# Patient Record
Sex: FEMALE | Race: WHITE | Employment: OTHER | URBAN - METROPOLITAN AREA
[De-identification: names, ages, dates, MRNs, and addresses within clinical notes are randomized per-mention and may not be internally consistent; named-entity substitution may affect disease eponyms.]

---

## 2021-04-05 ENCOUNTER — TRANSCRIBE ORDER (OUTPATIENT)
Dept: SCHEDULING | Age: 76
End: 2021-04-05

## 2021-04-05 DIAGNOSIS — R22.32 LOCALIZED SWELLING, MASS AND LUMP, UPPER LIMB, LEFT: Primary | ICD-10-CM

## 2021-04-13 ENCOUNTER — HOSPITAL ENCOUNTER (OUTPATIENT)
Dept: MRI IMAGING | Age: 76
Discharge: HOME | End: 2021-04-13
Attending: ORTHOPAEDIC SURGERY

## 2021-04-13 VITALS — WEIGHT: 127 LBS

## 2021-04-13 DIAGNOSIS — R22.32 LOCALIZED SWELLING, MASS AND LUMP, UPPER LIMB, LEFT: ICD-10-CM

## 2021-04-13 RX ORDER — GADOTERATE MEGLUMINE 376.9 MG/ML
10 INJECTION INTRAVENOUS
Status: DISCONTINUED | OUTPATIENT
Start: 2021-04-13 | End: 2021-04-13

## 2021-05-08 ENCOUNTER — HOSPITAL ENCOUNTER (OUTPATIENT)
Dept: MRI IMAGING | Age: 76
Discharge: HOME | End: 2021-05-08
Attending: ORTHOPAEDIC SURGERY

## 2021-05-11 ENCOUNTER — APPOINTMENT (OUTPATIENT)
Dept: VASCULAR SURGERY | Age: 76
DRG: 829 | End: 2021-05-11
Attending: FAMILY MEDICINE
Payer: MEDICARE

## 2021-05-11 ENCOUNTER — APPOINTMENT (OUTPATIENT)
Dept: GENERAL RADIOLOGY | Age: 76
DRG: 829 | End: 2021-05-11
Attending: EMERGENCY MEDICINE
Payer: MEDICARE

## 2021-05-11 ENCOUNTER — APPOINTMENT (OUTPATIENT)
Dept: CT IMAGING | Age: 76
DRG: 829 | End: 2021-05-11
Attending: FAMILY MEDICINE
Payer: MEDICARE

## 2021-05-11 ENCOUNTER — HOSPITAL ENCOUNTER (INPATIENT)
Age: 76
LOS: 3 days | Discharge: HOME HEALTH | DRG: 829 | End: 2021-05-14
Attending: EMERGENCY MEDICINE | Admitting: FAMILY MEDICINE
Payer: MEDICARE

## 2021-05-11 ENCOUNTER — APPOINTMENT (OUTPATIENT)
Dept: CT IMAGING | Age: 76
DRG: 829 | End: 2021-05-11
Attending: EMERGENCY MEDICINE
Payer: MEDICARE

## 2021-05-11 DIAGNOSIS — R22.32 ARM MASS, LEFT: ICD-10-CM

## 2021-05-11 DIAGNOSIS — D49.89 CARDIAC TUMOR: ICD-10-CM

## 2021-05-11 DIAGNOSIS — C79.89 MALIGNANT NEOPLASM METASTATIC TO OTHER SITE (HCC): ICD-10-CM

## 2021-05-11 DIAGNOSIS — F41.9 ANXIETY: ICD-10-CM

## 2021-05-11 DIAGNOSIS — R91.8 MASS OF LUNG: ICD-10-CM

## 2021-05-11 DIAGNOSIS — R77.8 TROPONIN I ABOVE REFERENCE RANGE: ICD-10-CM

## 2021-05-11 DIAGNOSIS — E87.1 HYPONATREMIA: Primary | ICD-10-CM

## 2021-05-11 PROBLEM — R06.02 SOB (SHORTNESS OF BREATH): Status: ACTIVE | Noted: 2021-05-11

## 2021-05-11 LAB
ABO + RH BLD: NORMAL
ALBUMIN SERPL-MCNC: 2.6 G/DL (ref 3.5–5)
ALBUMIN/GLOB SERPL: 0.7 {RATIO} (ref 1.1–2.2)
ALP SERPL-CCNC: 134 U/L (ref 45–117)
ALT SERPL-CCNC: 23 U/L (ref 12–78)
ANION GAP SERPL CALC-SCNC: 6 MMOL/L (ref 5–15)
AST SERPL-CCNC: 19 U/L (ref 15–37)
ATRIAL RATE: 93 BPM
BASOPHILS # BLD: 0.1 K/UL (ref 0–0.1)
BASOPHILS NFR BLD: 0 % (ref 0–1)
BILIRUB SERPL-MCNC: 0.3 MG/DL (ref 0.2–1)
BLOOD GROUP ANTIBODIES SERPL: NORMAL
BNP SERPL-MCNC: 504 PG/ML
BUN SERPL-MCNC: 13 MG/DL (ref 6–20)
BUN/CREAT SERPL: 22 (ref 12–20)
CALCIUM SERPL-MCNC: 9.5 MG/DL (ref 8.5–10.1)
CALCULATED P AXIS, ECG09: 56 DEGREES
CALCULATED R AXIS, ECG10: -45 DEGREES
CALCULATED T AXIS, ECG11: 24 DEGREES
CHLORIDE SERPL-SCNC: 98 MMOL/L (ref 97–108)
CO2 SERPL-SCNC: 27 MMOL/L (ref 21–32)
COMMENT, HOLDF: NORMAL
COMMENT, HOLDF: NORMAL
COVID-19 RAPID TEST, COVR: NOT DETECTED
CREAT SERPL-MCNC: 0.6 MG/DL (ref 0.55–1.02)
DIAGNOSIS, 93000: NORMAL
DIFFERENTIAL METHOD BLD: ABNORMAL
EOSINOPHIL # BLD: 0.1 K/UL (ref 0–0.4)
EOSINOPHIL NFR BLD: 0 % (ref 0–7)
ERYTHROCYTE [DISTWIDTH] IN BLOOD BY AUTOMATED COUNT: 13.5 % (ref 11.5–14.5)
GLOBULIN SER CALC-MCNC: 3.8 G/DL (ref 2–4)
GLUCOSE SERPL-MCNC: 171 MG/DL (ref 65–100)
HCT VFR BLD AUTO: 34.6 % (ref 35–47)
HGB BLD-MCNC: 10.8 G/DL (ref 11.5–16)
IMM GRANULOCYTES # BLD AUTO: 0.1 K/UL (ref 0–0.04)
IMM GRANULOCYTES NFR BLD AUTO: 0 % (ref 0–0.5)
LACTATE SERPL-SCNC: 1.5 MMOL/L (ref 0.4–2)
LYMPHOCYTES # BLD: 1.2 K/UL (ref 0.8–3.5)
LYMPHOCYTES NFR BLD: 7 % (ref 12–49)
MCH RBC QN AUTO: 24.8 PG (ref 26–34)
MCHC RBC AUTO-ENTMCNC: 31.2 G/DL (ref 30–36.5)
MCV RBC AUTO: 79.4 FL (ref 80–99)
MONOCYTES # BLD: 0.9 K/UL (ref 0–1)
MONOCYTES NFR BLD: 5 % (ref 5–13)
NEUTS SEG # BLD: 13.7 K/UL (ref 1.8–8)
NEUTS SEG NFR BLD: 88 % (ref 32–75)
NRBC # BLD: 0 K/UL (ref 0–0.01)
NRBC BLD-RTO: 0 PER 100 WBC
P-R INTERVAL, ECG05: 128 MS
PLATELET # BLD AUTO: 393 K/UL (ref 150–400)
PMV BLD AUTO: 10.4 FL (ref 8.9–12.9)
POTASSIUM SERPL-SCNC: 3.4 MMOL/L (ref 3.5–5.1)
PROT SERPL-MCNC: 6.4 G/DL (ref 6.4–8.2)
Q-T INTERVAL, ECG07: 334 MS
QRS DURATION, ECG06: 76 MS
QTC CALCULATION (BEZET), ECG08: 415 MS
RBC # BLD AUTO: 4.36 M/UL (ref 3.8–5.2)
SAMPLES BEING HELD,HOLD: NORMAL
SAMPLES BEING HELD,HOLD: NORMAL
SODIUM SERPL-SCNC: 131 MMOL/L (ref 136–145)
SOURCE, COVRS: NORMAL
SPECIMEN EXP DATE BLD: NORMAL
TROPONIN I SERPL-MCNC: 0.15 NG/ML
VENTRICULAR RATE, ECG03: 93 BPM
WBC # BLD AUTO: 15.9 K/UL (ref 3.6–11)

## 2021-05-11 PROCEDURE — 86900 BLOOD TYPING SEROLOGIC ABO: CPT

## 2021-05-11 PROCEDURE — 70450 CT HEAD/BRAIN W/O DYE: CPT

## 2021-05-11 PROCEDURE — 74011000636 HC RX REV CODE- 636: Performed by: RADIOLOGY

## 2021-05-11 PROCEDURE — 83605 ASSAY OF LACTIC ACID: CPT

## 2021-05-11 PROCEDURE — 87635 SARS-COV-2 COVID-19 AMP PRB: CPT

## 2021-05-11 PROCEDURE — 71046 X-RAY EXAM CHEST 2 VIEWS: CPT

## 2021-05-11 PROCEDURE — 84484 ASSAY OF TROPONIN QUANT: CPT

## 2021-05-11 PROCEDURE — 73060 X-RAY EXAM OF HUMERUS: CPT

## 2021-05-11 PROCEDURE — 99285 EMERGENCY DEPT VISIT HI MDM: CPT

## 2021-05-11 PROCEDURE — 36415 COLL VENOUS BLD VENIPUNCTURE: CPT

## 2021-05-11 PROCEDURE — 74011250636 HC RX REV CODE- 250/636: Performed by: EMERGENCY MEDICINE

## 2021-05-11 PROCEDURE — 99223 1ST HOSP IP/OBS HIGH 75: CPT | Performed by: INTERNAL MEDICINE

## 2021-05-11 PROCEDURE — 85025 COMPLETE CBC W/AUTO DIFF WBC: CPT

## 2021-05-11 PROCEDURE — 83880 ASSAY OF NATRIURETIC PEPTIDE: CPT

## 2021-05-11 PROCEDURE — 93971 EXTREMITY STUDY: CPT

## 2021-05-11 PROCEDURE — 65660000000 HC RM CCU STEPDOWN

## 2021-05-11 PROCEDURE — 87040 BLOOD CULTURE FOR BACTERIA: CPT

## 2021-05-11 PROCEDURE — 93005 ELECTROCARDIOGRAM TRACING: CPT

## 2021-05-11 PROCEDURE — 80053 COMPREHEN METABOLIC PANEL: CPT

## 2021-05-11 PROCEDURE — 74011250636 HC RX REV CODE- 250/636: Performed by: FAMILY MEDICINE

## 2021-05-11 PROCEDURE — 71275 CT ANGIOGRAPHY CHEST: CPT

## 2021-05-11 RX ORDER — SODIUM CHLORIDE 9 MG/ML
75 INJECTION, SOLUTION INTRAVENOUS CONTINUOUS
Status: DISCONTINUED | OUTPATIENT
Start: 2021-05-11 | End: 2021-05-14 | Stop reason: HOSPADM

## 2021-05-11 RX ORDER — OMEPRAZOLE 10 MG/1
20 CAPSULE, DELAYED RELEASE ORAL 2 TIMES DAILY
COMMUNITY

## 2021-05-11 RX ORDER — POTASSIUM CHLORIDE 750 MG/1
10 TABLET, FILM COATED, EXTENDED RELEASE ORAL
Status: ACTIVE | OUTPATIENT
Start: 2021-05-11 | End: 2021-05-12

## 2021-05-11 RX ORDER — SODIUM CHLORIDE 0.9 % (FLUSH) 0.9 %
5-40 SYRINGE (ML) INJECTION EVERY 8 HOURS
Status: DISCONTINUED | OUTPATIENT
Start: 2021-05-11 | End: 2021-05-14 | Stop reason: HOSPADM

## 2021-05-11 RX ORDER — GUAIFENESIN 100 MG/5ML
81 LIQUID (ML) ORAL DAILY
Status: DISCONTINUED | OUTPATIENT
Start: 2021-05-11 | End: 2021-05-14 | Stop reason: HOSPADM

## 2021-05-11 RX ORDER — ALBUTEROL SULFATE 0.83 MG/ML
2.5 SOLUTION RESPIRATORY (INHALATION)
Status: DISCONTINUED | OUTPATIENT
Start: 2021-05-11 | End: 2021-05-14 | Stop reason: HOSPADM

## 2021-05-11 RX ORDER — ATORVASTATIN CALCIUM 20 MG/1
20 TABLET, FILM COATED ORAL
COMMUNITY

## 2021-05-11 RX ORDER — HEPARIN SODIUM 5000 [USP'U]/ML
5000 INJECTION, SOLUTION INTRAVENOUS; SUBCUTANEOUS EVERY 8 HOURS
Status: DISCONTINUED | OUTPATIENT
Start: 2021-05-11 | End: 2021-05-14 | Stop reason: HOSPADM

## 2021-05-11 RX ORDER — SODIUM CHLORIDE 0.9 % (FLUSH) 0.9 %
5-40 SYRINGE (ML) INJECTION AS NEEDED
Status: DISCONTINUED | OUTPATIENT
Start: 2021-05-11 | End: 2021-05-14 | Stop reason: HOSPADM

## 2021-05-11 RX ORDER — ACETAMINOPHEN 325 MG/1
650 TABLET ORAL
Status: DISCONTINUED | OUTPATIENT
Start: 2021-05-11 | End: 2021-05-14 | Stop reason: HOSPADM

## 2021-05-11 RX ORDER — SODIUM CHLORIDE 9 MG/ML
100 INJECTION, SOLUTION INTRAVENOUS CONTINUOUS
Status: DISCONTINUED | OUTPATIENT
Start: 2021-05-11 | End: 2021-05-12

## 2021-05-11 RX ORDER — ZOLPIDEM TARTRATE 10 MG/1
12.5 TABLET ORAL
COMMUNITY

## 2021-05-11 RX ADMIN — AZITHROMYCIN MONOHYDRATE 500 MG: 500 INJECTION, POWDER, LYOPHILIZED, FOR SOLUTION INTRAVENOUS at 18:44

## 2021-05-11 RX ADMIN — SODIUM CHLORIDE 1000 ML: 9 INJECTION, SOLUTION INTRAVENOUS at 12:27

## 2021-05-11 RX ADMIN — IOPAMIDOL 70 ML: 755 INJECTION, SOLUTION INTRAVENOUS at 13:14

## 2021-05-11 RX ADMIN — SODIUM CHLORIDE 75 ML/HR: 9 INJECTION, SOLUTION INTRAVENOUS at 18:43

## 2021-05-11 RX ADMIN — HEPARIN SODIUM 5000 UNITS: 5000 INJECTION INTRAVENOUS; SUBCUTANEOUS at 18:44

## 2021-05-11 NOTE — H&P
History & Physical    Primary Care Provider: Juany Farfan NP  Source of Information: Patient     History of Presenting Illness:   Antonio Xiao is a 70 y.o. female who presents with cough    History was primarily obtained from the patient    Patient reports that about a few months back she started noticing a big lump appearing on her left biceps. Patient not Evaluated by Primary Care Physician Who Ordered an MRI of the Lung but She Could Not have MRI done after multiple trials due to claustrophobia. Patient states that she was diagnosed with bronchitis recently, finished Z-Leonel approximately, patient reports that she has had some hemoptysis associated with her symptoms. Patient reports that she has had some fullness in her chest and feels like every time she swallows \"something is there\". Patient reports that she has had some choking sensation with swallowing liquids. Patient reports that she has seen Dr. Jeison Collins) for the mass but could not get an MRI done due to claustrophobia. Patient denies any other complaints or problems. The patient denies any Headache, blurry vision, sore throat, trouble swallowing, trouble with speech, chest pain, fever, chills, N/V/D, abd pain, urinary symptoms, constipation, recent travels, sick contacts, focal or generalized neurological symptoms,  falls, injuries, rashes, contact with COVID-19 diagnosed patients, hematemesis, melena, hematuria, rashes, denies starting any new medications and denies any other concerns or problems besides as mentioned above. Review of Systems:  A comprehensive review of systems was negative except for that written in the History of Present Illness. All other systems reviewed, pertinent positives and negatives noted in HPI    Past Medical History:   Diagnosis Date    Chronic obstructive pulmonary disease ([facility] [address]     Ill-defined condition     tumor in arm      History reviewed.  No pertinent surgical history. Prior to Admission medications    Not on File     No Known Allergies   History reviewed. No pertinent family history. Family history was discussed with the patient, all pertinent and relevant details are mentioned as above, no other pertinent and relevant family history was noted on my discussion with the patient. Patient specifically denies any history of Gaucher disease in the family  SOCIAL HISTORY:  Patient resides:  Independently x   Assisted Living    SNF    With family care       Smoking history:   None    Former x   Chronic      Alcohol history:   None    Social x   Chronic      Ambulates:   Independently x   w/cane    w/walker    w/wc    CODE STATUS:  DNR    Full x   Other      Objective:     Physical Exam:     Visit Vitals  /69   Pulse 84   Temp 97.2 °F (36.2 °C)   Resp 18   SpO2 98%      O2 Device: None (Room air)    General : alert x 3, awake, no acute distress, resting in bed, pleasant /female, appears to be stated age  [de-identified]: PEERL, EOMI, moist mucus membrane, TM clear  Neck: supple, no JVD, no meningeal signs  Chest: Scattered wheezes bilateral lung bases  CVS: S1 S2 heard, Capillary refill less than 2 seconds  Abd: soft/ Non tender, non distended, BS physiological,   Ext: no clubbing, no cyanosis, about 4-5 cm mass on the left upper extremity  Neuro/Psych: pleasant mood and affect, CN 2-12 grossly intact, sensory grossly within normal limit, Strength 5/5 in all extremities, DTR 1+ x 4  Skin: warm     EKG: Normal sinus rhythm with nonspecific ST changes      Data Review:     Recent Days:  Recent Labs     05/11/21  1138   WBC 15.9*   HGB 10.8*   HCT 34.6*        Recent Labs     05/11/21  1138   *   K 3.4*   CL 98   CO2 27   *   BUN 13   CREA 0.60   CA 9.5   ALB 2.6*   ALT 23     No results for input(s): PH, PCO2, PO2, HCO3, FIO2 in the last 72 hours.     24 Hour Results:  Recent Results (from the past 24 hour(s))   CBC WITH AUTOMATED DIFF    Collection Time: 05/11/21 11:38 AM   Result Value Ref Range    WBC 15.9 (H) 3.6 - 11.0 K/uL    RBC 4.36 3.80 - 5.20 M/uL    HGB 10.8 (L) 11.5 - 16.0 g/dL    HCT 34.6 (L) 35.0 - 47.0 %    MCV 79.4 (L) 80.0 - 99.0 FL    MCH 24.8 (L) 26.0 - 34.0 PG    MCHC 31.2 30.0 - 36.5 g/dL    RDW 13.5 11.5 - 14.5 %    PLATELET 457 777 - 244 K/uL    MPV 10.4 8.9 - 12.9 FL    NRBC 0.0 0  WBC    ABSOLUTE NRBC 0.00 0.00 - 0.01 K/uL    NEUTROPHILS 88 (H) 32 - 75 %    LYMPHOCYTES 7 (L) 12 - 49 %    MONOCYTES 5 5 - 13 %    EOSINOPHILS 0 0 - 7 %    BASOPHILS 0 0 - 1 %    IMMATURE GRANULOCYTES 0 0.0 - 0.5 %    ABS. NEUTROPHILS 13.7 (H) 1.8 - 8.0 K/UL    ABS. LYMPHOCYTES 1.2 0.8 - 3.5 K/UL    ABS. MONOCYTES 0.9 0.0 - 1.0 K/UL    ABS. EOSINOPHILS 0.1 0.0 - 0.4 K/UL    ABS. BASOPHILS 0.1 0.0 - 0.1 K/UL    ABS. IMM. GRANS. 0.1 (H) 0.00 - 0.04 K/UL    DF AUTOMATED     METABOLIC PANEL, COMPREHENSIVE    Collection Time: 05/11/21 11:38 AM   Result Value Ref Range    Sodium 131 (L) 136 - 145 mmol/L    Potassium 3.4 (L) 3.5 - 5.1 mmol/L    Chloride 98 97 - 108 mmol/L    CO2 27 21 - 32 mmol/L    Anion gap 6 5 - 15 mmol/L    Glucose 171 (H) 65 - 100 mg/dL    BUN 13 6 - 20 MG/DL    Creatinine 0.60 0.55 - 1.02 MG/DL    BUN/Creatinine ratio 22 (H) 12 - 20      GFR est AA >60 >60 ml/min/1.73m2    GFR est non-AA >60 >60 ml/min/1.73m2    Calcium 9.5 8.5 - 10.1 MG/DL    Bilirubin, total 0.3 0.2 - 1.0 MG/DL    ALT (SGPT) 23 12 - 78 U/L    AST (SGOT) 19 15 - 37 U/L    Alk. phosphatase 134 (H) 45 - 117 U/L    Protein, total 6.4 6.4 - 8.2 g/dL    Albumin 2.6 (L) 3.5 - 5.0 g/dL    Globulin 3.8 2.0 - 4.0 g/dL    A-G Ratio 0.7 (L) 1.1 - 2.2     SAMPLES BEING HELD    Collection Time: 05/11/21 11:38 AM   Result Value Ref Range    SAMPLES BEING HELD 1RED,1BLU     COMMENT        Add-on orders for these samples will be processed based on acceptable specimen integrity and analyte stability, which may vary by analyte.    TYPE & SCREEN    Collection Time: 05/11/21 11:38 AM Result Value Ref Range    Crossmatch Expiration 05/14/2021,2359     ABO/Rh(D) Femi Patellich POSITIVE     Antibody screen NEG    NT-PRO BNP    Collection Time: 05/11/21 11:38 AM   Result Value Ref Range    NT pro- (H) <125 PG/ML   TROPONIN I    Collection Time: 05/11/21 11:38 AM   Result Value Ref Range    Troponin-I, Qt. 0.15 (H) <0.05 ng/mL   EKG, 12 LEAD, INITIAL    Collection Time: 05/11/21 11:38 AM   Result Value Ref Range    Ventricular Rate 93 BPM    Atrial Rate 93 BPM    P-R Interval 128 ms    QRS Duration 76 ms    Q-T Interval 334 ms    QTC Calculation (Bezet) 415 ms    Calculated P Axis 56 degrees    Calculated R Axis -45 degrees    Calculated T Axis 24 degrees    Diagnosis       Normal sinus rhythm  Low voltage QRS  Left anterior fascicular block  No previous ECGs available           Imaging:   Xr Chest Pa Lat    Result Date: 5/11/2021  Round opacity in the right upper lobe and right hilar fullness are suspicious for a lung mass and lymphadenopathy. Xr Humerus Lt    Result Date: 5/11/2021  Nonspecific soft tissue mass in the midportion of the left upper arm. MRI without and with contrast could be used for further evaluation. 23X     Cta Chest W Or W Wo Cont    Result Date: 5/11/2021  1. Large right upper lobe mass extending to the right hilum. There is a lesion of the right hilum and right side of the mediastinum. There is a small area of local invasion in the anterolateral right second rib. Several metastatic lymph nodes are seen in the mediastinum. 2. Multiple right-sided pulmonary nodules likely representing metastases. 3. Small right pleural effusion. 4. Several nonspecific groundglass opacities in the left lung. 5. Destructive osseous metastases in the right lateral fifth rib, right T10 vertebral body, and left glenoid. 6. Incompletely seen mass in the left bicep muscle. 7. Enlarged lymph node in the left axilla.  8. 1.5 cm nodule in the inferior right breast. 9. Nodular thickening of the bilateral adrenal glands worrisome for metastases. 10. 4.7 x 3.9 cm mass in the left upper quadrant attached to the left colon. 11. 1.6 cm mass of the intra-atrial septum which may represent metastasis versus myxoma. Dr. Christina Key notified of malignancy at 2:35 PM on 5/11/2021. 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  Breast nodule  Metastasis to the adrenal glands  Colon mass  Intra-atrial septal cardiac mass  Hemoptysis  Hyponatremia  Hypokalemia  Elevated troponin  Left biceps mass  Leukocytosis  Anemia    patient will be admitted on a telemetry bed  Patient appears to have metastatic disease, unclear primary  IV hydration, oncology consult, pain control,  May consider palliative care consult, patient with colon mass, may consider GI consult  Elevated troponin likely secondary to intracardiac mass, patient denies any chest pain, continue aspirin, cardiac surgery consult requested, continue to monitor, obtain echocardiogram  Ortho consult for left upper arm mass  Hyponatremia likely secondary to hypovolemia, gentle IV hydration, repeat labs in the morning, continue to monitor  Replace potassium, check magnesium  Patient with leukocytosis, questionable pulmonary infection, broad-spectrum IV antibiotics empirically, blood cultures and continue to monitor  DVT prophylaxis: SCDs               Please note that this dictation was completed with VM[employer], the computer voice recognition software. Quite often unanticipated grammatical, syntax, homophones, and other interpretive errors are inadvertently transcribed by the computer software. Please disregard these errors. Please excuse any errors that have escaped final proofreading.          Signed By: Mariaelena Chapin MD     May 11, 2021 WDL

## 2021-05-11 NOTE — PROGRESS NOTES
Bedside shift change report given to Selvin RN (oncoming nurse) by Remy Conner RN (offgoing nurse). Report included the following information SBAR, ED Summary, Intake/Output, MAR, Med Rec Status and Cardiac Rhythm nsr.

## 2021-05-11 NOTE — ED TRIAGE NOTES
Pt sent here for bleeding in her lungs by pcp, pt stated she has coughed up about a tsp of bright red blood today, +sob, denies cp, denies fever or chills , +nausea

## 2021-05-11 NOTE — ED PROVIDER NOTES
Please note that this dictation was completed with [employer], the computer voice recognition software.  Quite often unanticipated grammatical, syntax, homophones, and other interpretive errors are inadvertently transcribed by the computer software.  Please disregard these errors.  Please excuse any errors that have escaped final proofreading. 30-year-old female past medical history markable for COPD, questionable tumor in her left arm per her PCP Dr. Mariama Hui presents ER complaining of \" increased shortness of breath x1 to 2 weeks. \"  Patient admits she is still smoking states she also gets short of breath when she smokes now. This is been a gradually worsening process patient approximate over the past 6 months. Patient states she was recently recently treated for bronchitis by her PCP finished a Z-Leonel approximately 2 days ago. Patient is noted that over the previous 2 or 3 days she is also coughed up blood on occasion when she coughs. She denies overt chest pain. Patient also relates that approximately past several months she has noticed an increased \"fullness\" she experiences in her chest when she swallows certain foods. Recently certain foods been \"getting caught in my chest when I swallow. \"  She has does drink water where they cause her to choke and she vomits them back up. \"  This also seems to be worsening. Patient denies overt fevers states she may have had some chills denies other current systemic complaints. I asked about the mass on her left arm she said that has also been there approximately 3 months, started out smaller and has grown to its current size. She is seen Dr. Mariama Hui (Ortho) who wanted to get an MRI to make sure it was not a sarcoma however patient was unable to tolerate the outpatient MRI was sent to the ER today for further evaluation of her left arm mass as well as the new onset hemoptysis.   Discussed checking lab work obtaining a chest CT imaging and doing the MRI inpatient the patient and her daughter agree with this plan. pt denies HA, vison changes, diff swallowing, CP, Abd pain, F/Ch, N/V, D/Cons or other current systemic complaints    Social/ PSH reviewed in EMR    EMR Chart Reviewed           Past Medical History:   Diagnosis Date    Chronic obstructive pulmonary disease ([facility] [address]     Ill-defined condition     tumor in arm       History reviewed. No pertinent surgical history. History reviewed. No pertinent family history. Social History     Socioeconomic History    Marital status:      Spouse name: Not on file    Number of children: Not on file    Years of education: Not on file    Highest education level: Not on file   Occupational History    Not on file   Social Needs    Financial resource strain: Not on file    Food insecurity     Worry: Not on file     Inability: Not on file    Transportation needs     Medical: Not on file     Non-medical: Not on file   Tobacco Use    Smoking status: Not on file   Substance and Sexual Activity    Alcohol use: Not on file    Drug use: Not on file    Sexual activity: Not on file   Lifestyle    Physical activity     Days per week: Not on file     Minutes per session: Not on file    Stress: Not on file   Relationships    Social connections     Talks on phone: Not on file     Gets together: Not on file     Attends Baptism service: Not on file     Active member of club or organization: Not on file     Attends meetings of clubs or organizations: Not on file     Relationship status: Not on file    Intimate partner violence     Fear of current or ex partner: Not on file     Emotionally abused: Not on file     Physically abused: Not on file     Forced sexual activity: Not on file   Other Topics Concern    Not on file   Social History Narrative    Not on file         ALLERGIES: Patient has no known allergies. Review of Systems   Constitutional: Positive for chills. Negative for appetite change and fever.    HENT: Positive for trouble swallowing. Negative for dental problem, drooling, rhinorrhea, sore throat and voice change. Eyes: Negative for photophobia and visual disturbance. Respiratory: Positive for cough and shortness of breath. Negative for chest tightness, wheezing and stridor. Cardiovascular: Negative for chest pain, palpitations and leg swelling. Gastrointestinal: Negative for abdominal pain, constipation, diarrhea, nausea and vomiting. Genitourinary: Negative for dysuria. Musculoskeletal: Positive for myalgias. Negative for back pain. Skin: Negative for rash. Neurological: Negative for facial asymmetry and speech difficulty. Psychiatric/Behavioral: Negative for confusion. All other systems reviewed and are negative. Vitals:    05/11/21 1123   BP: 102/61   Pulse: 90   Resp: 16   Temp: 97.2 °F (36.2 °C)   SpO2: 98%            Physical Exam  Vitals signs and nursing note reviewed. Constitutional:       General: She is not in acute distress. Appearance: Normal appearance. She is well-developed. She is not ill-appearing, toxic-appearing or diaphoretic. Comments: Uncomfortable appearing, AxOx4, speaking in complete sentences     HENT:      Head: Normocephalic and atraumatic. Right Ear: External ear normal.   Eyes:      General: No scleral icterus. Right eye: No discharge. Left eye: No discharge. Extraocular Movements: Extraocular movements intact. Conjunctiva/sclera: Conjunctivae normal.      Pupils: Pupils are equal, round, and reactive to light. Neck:      Musculoskeletal: Normal range of motion and neck supple. No neck rigidity or muscular tenderness. Vascular: No JVD. Trachea: No tracheal deviation. Cardiovascular:      Rate and Rhythm: Normal rate and regular rhythm. Pulses: Normal pulses. Heart sounds: Normal heart sounds. No murmur. No friction rub. No gallop.     Pulmonary:      Effort: Pulmonary effort is normal. No respiratory distress. Breath sounds: No stridor. Rales present. No wheezing or rhonchi. Comments: ?? RUL rales  Chest:      Chest wall: No tenderness. Abdominal:      General: Bowel sounds are normal.      Palpations: Abdomen is soft. Tenderness: There is no abdominal tenderness. There is no guarding or rebound. Hernia: No hernia is present. Genitourinary:     Vagina: No vaginal discharge. Musculoskeletal: Normal range of motion. General: Swelling, tenderness and deformity present. No signs of injury. Right lower leg: No edema. Left lower leg: No edema. Comments: LUE - noted tennis ball mass proximal LUE/ min redness/ feels 'hard' to palpation; pt has distal motor/ CV/ Sensation grossly intact    Skin:     General: Skin is warm and dry. Capillary Refill: Capillary refill takes less than 2 seconds. Coloration: Skin is not jaundiced or pale. Findings: No bruising, erythema, lesion or rash. Neurological:      General: No focal deficit present. Mental Status: She is alert and oriented to person, place, and time. Cranial Nerves: No cranial nerve deficit. Sensory: No sensory deficit. Motor: No weakness or abnormal muscle tone. Coordination: Coordination normal.      Gait: Gait normal.      Deep Tendon Reflexes: Reflexes normal.   Psychiatric:         Behavior: Behavior normal.         Thought Content: Thought content normal.          MDM  Number of Diagnoses or Management Options         Procedures    Chief Complaint   Patient presents with    Hemoptysis       12:07 PM  The patients presenting problems have been discussed, and they are in agreement with the care plan formulated and outlined with them. I have encouraged them to ask questions as they arise throughout their visit.     MEDICATIONS GIVEN:  Medications   sodium chloride 0.9 % bolus infusion 1,000 mL (has no administration in time range)       LABS REVIEWED:  Labs Reviewed   CBC WITH AUTOMATED DIFF - Abnormal; Notable for the following components:       Result Value    WBC 15.9 (*)     HGB 10.8 (*)     HCT 34.6 (*)     MCV 79.4 (*)     MCH 24.8 (*)     NEUTROPHILS 88 (*)     LYMPHOCYTES 7 (*)     ABS. NEUTROPHILS 13.7 (*)     ABS. IMM. GRANS. 0.1 (*)     All other components within normal limits   METABOLIC PANEL, COMPREHENSIVE   SAMPLES BEING HELD   TROPONIN I   NT-PRO BNP   TYPE & SCREEN       RADIOLOGY RESULTS:  The following have been ordered and reviewed:  _____________________________________________________________________  _____________________________________________________________________    EKG interpretation:   Rhythm: normal sinus rhythm; and regular . Rate (approx.): 92; Axis: normal; P wave: normal; QRS interval: normal ; ST/T wave: normal; Negative acute significant segmental elevations/ no old study    PROCEDURES:        CONSULTATIONS:       PROGRESS NOTES:      DIAGNOSIS:    1. Hyponatremia    2. Troponin I above reference range    3. Arm mass, left    4. Mass of lung    5. Malignant neoplasm metastatic to other site [facility])        PLAN:  1-      ED COURSE: The patients hospital course has been uncomplicated. 1:28 PM  Pt/ daughter told of results, agrees w/ Plans; Perfect Serve Consult for Admission  2:50 PM    ED Room Number: ER24/24  Patient Name and age:  Victor Manuel Orozco 70 y.o.  female  Working Diagnosis:   1. Hyponatremia    2. Troponin I above reference range    3. Arm mass, left    4. Mass of lung    5.  Malignant neoplasm metastatic to other site [facility])        COVID-19 Suspicion:  no  Sepsis present:  no  Reassessment needed: yes  Code Status:  Full Code  Readmission: no  Isolation Requirements:  no  Recommended Level of Care:  telemetry  Department:[facility] Adult ED - 21   Other:  Lung Mass/ multiple metastatic masses;

## 2021-05-11 NOTE — PROGRESS NOTES
[facility] [place] [address] [address], [address]  W: [phone]  F: [phone]    Reason for Visit:   Lei Benson is a 70 y.o. female who is seen in hospital/ ER at the request of Dr. Jonel Boogie for evaluation of abnormal CTs. Treatment History:   None from us yet    History of Present Illness:     Pt seen today in ER for abnormal CTs with likely metastatic cancer. No known cancer dx. No tissue dx of cancer. Pt being admitted for zhang. Pt is in bed getting ECHO at my visit. Daughter at bedside   Pt comfortable. C/o LUE pain. No fevers/ chills/ chest pain/ SOB/ nausea/ vomiting/diarrhea/    Past Medical History:   Diagnosis Date    Chronic obstructive pulmonary disease ([address]     Ill-defined condition     tumor in arm      History reviewed. No pertinent surgical history. Social History     Tobacco Use    Smoking status: Not on file   Substance Use Topics    Alcohol use: Not on file      History reviewed. No pertinent family history. Current Facility-Administered Medications   Medication Dose Route Frequency    0.9% sodium chloride infusion  75 mL/hr IntraVENous CONTINUOUS    aspirin chewable tablet 81 mg  81 mg Oral DAILY    potassium chloride SR (KLOR-CON 10) tablet 10 mEq  10 mEq Oral NOW    cefTRIAXone (ROCEPHIN) 1 g in 0.9% sodium chloride (MBP/ADV) 50 mL MBP  1 g IntraVENous Q24H    azithromycin (ZITHROMAX) 500 mg in 0.9% sodium chloride 250 mL (VIAL-MATE)  500 mg IntraVENous Q24H      No Known Allergies     Review of Systems: A complete review of systems was obtained, negative except as described above.     Physical Exam:     Visit Vitals  /73   Pulse 77   Temp 97.8 °F (36.6 °C)   Resp 20   SpO2 96%     ECOG PS: 1  General: No distress  Eyes: PERRLA, anicteric sclerae  HENT: Atraumatic  Neck: Supple  Respiratory: CTAB, normal respiratory effort  CV: Normal rate, regular rhythm  GI: Soft, nontender, nondistended  MS: in bed moving extremities  Skin: No rashes, ecchymoses, or petechiae. Normal temperature, turgor, and texture. Psych: Alert, oriented, appropriate affect, normal judgment/insight  Mass on LUE extremity    Results:     Lab Results   Component Value Date/Time    WBC 15.9 (H) 05/11/2021 11:38 AM    HGB 10.8 (L) 05/11/2021 11:38 AM    HCT 34.6 (L) 05/11/2021 11:38 AM    PLATELET 905 97/67/8641 11:38 AM    MCV 79.4 (L) 05/11/2021 11:38 AM    ABS. NEUTROPHILS 13.7 (H) 05/11/2021 11:38 AM     Lab Results   Component Value Date/Time    Sodium 131 (L) 05/11/2021 11:38 AM    Potassium 3.4 (L) 05/11/2021 11:38 AM    Chloride 98 05/11/2021 11:38 AM    CO2 27 05/11/2021 11:38 AM    Glucose 171 (H) 05/11/2021 11:38 AM    BUN 13 05/11/2021 11:38 AM    Creatinine 0.60 05/11/2021 11:38 AM    GFR est AA >60 05/11/2021 11:38 AM    GFR est non-AA >60 05/11/2021 11:38 AM    Calcium 9.5 05/11/2021 11:38 AM     Lab Results   Component Value Date/Time    Bilirubin, total 0.3 05/11/2021 11:38 AM    ALT (SGPT) 23 05/11/2021 11:38 AM    Alk. phosphatase 134 (H) 05/11/2021 11:38 AM    Protein, total 6.4 05/11/2021 11:38 AM    Albumin 2.6 (L) 05/11/2021 11:38 AM    Globulin 3.8 05/11/2021 11:38 AM       CT Results (most recent):  Results from Hospital Encounter encounter on 05/11/21   CTA CHEST W OR W WO CONT    Narrative EXAM: CTA CHEST W OR W WO CONT    INDICATION: hemoptosis/ ? sarcoma LUE    COMPARISON: Radiographs same day. CONTRAST: 70 mL of Isovue-370. TECHNIQUE:   Precontrast  images were obtained to localize the volume for acquisition. Multislice helical CT arteriography was performed from the diaphragm to the  thoracic inlet during uneventful rapid bolus intravenous contrast  administration. Lung and soft tissue windows were generated. Coronal and  sagittal images were generated and 3D post processing consisting of coronal  maximum intensity images was performed.   CT dose reduction was achieved through  use of a standardized protocol tailored for this examination and automatic  exposure control for dose modulation. FINDINGS:  THYROID: No nodule. MEDIASTINUM: There is a 1.1 cm anterior mediastinal lymph node. There is a 2.1  cm subcarinal lymph node. There is invasion of the right side of the  mediastinum. CHARLES: There is a large mass encompassing the right hilum. THORACIC AORTA: No dissection or aneurysm. PULMONARY ARTERIES: The pulmonary arteries are well enhanced. No evidence of  pulmonary embolus. There is significant narrowing of the right upper lobe  pulmonary arteries by the masses. TRACHEA/BRONCHI: There is moderate to severe narrowing of the right upper lobe  bronchus. There is mild narrowing of the right mainstem bronchus. There is mild  narrowing of the right middle and lower lobe bronchi. ESOPHAGUS: No wall thickening or dilatation. HEART: There is a mass involving the intra-atrial septa measuring 1.5 x 1.6 cm. This protrudes into the left atrium. PLEURA: There is a mild right pleural effusion. LUNGS: There is a large mass in the anterior right upper lobe measuring 6.9 x  3.4 cm. This extends to the superior right hilum. The mass continues in the  right hilum and envelops many of the structures of the right hilum measuring 7.2  x 5.7 cm. There is a 1.6 cm spiculated pulmonary nodule in the right apex on  image 99. There are several juxtapleural nodules in the right apex measuring up  to centimeters in size. There is a 9 mm nodule along the right oblique fissure  on image 84. There are adjacent 6 mm and 5 mm pulmonary nodule in the superior  segment right lower lobe on image 86. There is a 9 mm pulmonary nodule right  middle lobe on image 81. There is a 7 mm pulmonary nodule in the posterior right  lower lobe on image 76. Areas of atelectasis are seen in the most inferior  posterior right lower lobe. There is a 5 mm groundglass opacity in the lateral  left upper lobe on image 93.  There is an 8 mm ground glass nodule in the lateral  left upper lobe on image 80. There is a 6 mm ground glass opacity in the left  upper lobe on image 70. INCIDENTALLY IMAGED UPPER ABDOMEN: The patient is status post cholecystectomy. There is significant nodular thickening of both adrenal glands worrisome for  metastatic disease. There is a 4.7 x 3.9 cm mass in the left upper quadrant  attached to the left colon. BONES: There is a destructive osseous metastasis in the right side of T10  extending into the pedicle measuring 2.6 cm. There is a nonspecific 5 mm lytic  lesion in the right humeral head. There is a destructive lesion in the right  fifth rib with pathologic fracture. There is a small area of local invasion in  the anterolateral right second rib. There is a destructive lytic lesion in the  glenoid. The left upper extremity is incompletely imaged. There is a 3.7 cm mass  incompletely seen in the left biceps muscle. There is a 1.5 cm nodule in the inferior right breast on image 54. There is a 1.6 cm left axillary lymph node on image 92. Impression 1. Large right upper lobe mass extending to the right hilum. There is a lesion  of the right hilum and right side of the mediastinum. There is a small area of  local invasion in the anterolateral right second rib. Several metastatic lymph  nodes are seen in the mediastinum. 2. Multiple right-sided pulmonary nodules likely representing metastases. 3. Small right pleural effusion. 4. Several nonspecific groundglass opacities in the left lung. 5. Destructive osseous metastases in the right lateral fifth rib, right T10  vertebral body, and left glenoid. 6. Incompletely seen mass in the left bicep muscle. 7. Enlarged lymph node in the left axilla. 8. 1.5 cm nodule in the inferior right breast.  9. Nodular thickening of the bilateral adrenal glands worrisome for metastases. 10. 4.7 x 3.9 cm mass in the left upper quadrant attached to the left colon.   11. 1.6 cm mass of the intra-atrial septum which may represent metastasis versus  myxoma. Dr. Elma Samaniego notified of malignancy at 2:35 PM on 5/11/2021. Records reviewed and summarized above. Pathology report(s) reviewed above. Radiology report(s) reviewed above. Assessment/PLAN:     1)  Lung mass/ colon mass/bone mets/ breast mass/ large LUE mass  Found on CT today. Presumed metastatic cancer. Need tissue dx. Reviewed scans with pt and daughter at bedside in ER. D/w hospitalist and ER attending today. Easiest area to biopsy would be LUE mass. Can see if IR or ortho wants to biopsy. Would complete CT A/P  Consider GI eval for colon mass. Can do breast exam ( pt getting ECHO during my visit today) and mammo. Would do brain MRI. Biopsy may give us site of origin which will help direct therapy, along with molecular testing. Pt and daughter want to do biopsy and want to know dx. Pt is in bed and comfortable at my visit. Consider palliative care support. 2) LUE mass painful. Ortho/ IR for biopsy. Pain meds as needed. 3) hx of hemoptysis. Monitor. 4) psychosocial.  Mood good. Daughter at bedside and supportive. We will follow  Call if questions  D/w hospitalist and ER attending today    I appreciate the opportunity to participate in Ms. Gabino Toledo care.     Signed By: Trisha Davis DO

## 2021-05-11 NOTE — PROGRESS NOTES
TRANSFER - IN REPORT:    Verbal report received from germán RN(name) on Qian Ferrer  being received from ED (unit) for routine progression of care      Report consisted of patients Situation, Background, Assessment and   Recommendations(SBAR). Information from the following report(s) SBAR, ED Summary, Intake/Output, MAR, Med Rec Status and Cardiac Rhythm nsr was reviewed with the receiving nurse. Opportunity for questions and clarification was provided. Assessment completed upon patients arrival to unit and care assumed.

## 2021-05-12 ENCOUNTER — APPOINTMENT (OUTPATIENT)
Dept: NON INVASIVE DIAGNOSTICS | Age: 76
DRG: 829 | End: 2021-05-12
Attending: FAMILY MEDICINE
Payer: MEDICARE

## 2021-05-12 ENCOUNTER — APPOINTMENT (OUTPATIENT)
Dept: ULTRASOUND IMAGING | Age: 76
DRG: 829 | End: 2021-05-12
Attending: INTERNAL MEDICINE
Payer: MEDICARE

## 2021-05-12 LAB
ALBUMIN SERPL-MCNC: 2 G/DL (ref 3.5–5)
ALBUMIN/GLOB SERPL: 0.7 {RATIO} (ref 1.1–2.2)
ALP SERPL-CCNC: 107 U/L (ref 45–117)
ALT SERPL-CCNC: 19 U/L (ref 12–78)
ANION GAP SERPL CALC-SCNC: 8 MMOL/L (ref 5–15)
AST SERPL-CCNC: 21 U/L (ref 15–37)
BASOPHILS # BLD: 0.1 K/UL (ref 0–0.1)
BASOPHILS NFR BLD: 0 % (ref 0–1)
BILIRUB SERPL-MCNC: 0.2 MG/DL (ref 0.2–1)
BUN SERPL-MCNC: 9 MG/DL (ref 6–20)
BUN/CREAT SERPL: 21 (ref 12–20)
CALCIUM SERPL-MCNC: 8.2 MG/DL (ref 8.5–10.1)
CANCER AG125 SERPL-ACNC: 384 U/ML (ref 1.5–35)
CEA SERPL-MCNC: 89.2 NG/ML
CHLORIDE SERPL-SCNC: 102 MMOL/L (ref 97–108)
CO2 SERPL-SCNC: 24 MMOL/L (ref 21–32)
CREAT SERPL-MCNC: 0.43 MG/DL (ref 0.55–1.02)
DIFFERENTIAL METHOD BLD: ABNORMAL
ECHO AO ROOT DIAM: 3.27 CM
ECHO LV INTERNAL DIMENSION DIASTOLIC: 4.05 CM (ref 3.9–5.3)
ECHO LV INTERNAL DIMENSION SYSTOLIC: 2.45 CM
ECHO LV IVSD: 1.04 CM (ref 0.6–0.9)
ECHO LV MASS 2D: 131.4 G (ref 67–162)
ECHO LV MASS INDEX 2D: 86 G/M2 (ref 43–95)
ECHO LV POSTERIOR WALL DIASTOLIC: 0.98 CM (ref 0.6–0.9)
ECHO LVOT DIAM: 1.95 CM
ECHO PV PEAK INSTANTANEOUS GRADIENT SYSTOLIC: 2.08 MMHG
ECHO TV REGURGITANT MAX VELOCITY: 250.1 CM/S
ECHO TV REGURGITANT PEAK GRADIENT: 25.02 MMHG
EOSINOPHIL # BLD: 0.2 K/UL (ref 0–0.4)
EOSINOPHIL NFR BLD: 1 % (ref 0–7)
ERYTHROCYTE [DISTWIDTH] IN BLOOD BY AUTOMATED COUNT: 13.8 % (ref 11.5–14.5)
FERRITIN SERPL-MCNC: 232 NG/ML (ref 26–388)
FOLATE SERPL-MCNC: 5 NG/ML (ref 5–21)
GLOBULIN SER CALC-MCNC: 3 G/DL (ref 2–4)
GLUCOSE SERPL-MCNC: 105 MG/DL (ref 65–100)
HCT VFR BLD AUTO: 27.3 % (ref 35–47)
HGB BLD-MCNC: 8.4 G/DL (ref 11.5–16)
IMM GRANULOCYTES # BLD AUTO: 0.1 K/UL (ref 0–0.04)
IMM GRANULOCYTES NFR BLD AUTO: 1 % (ref 0–0.5)
IRON SATN MFR SERPL: 7 % (ref 20–50)
IRON SERPL-MCNC: 12 UG/DL (ref 35–150)
LYMPHOCYTES # BLD: 1.3 K/UL (ref 0.8–3.5)
LYMPHOCYTES NFR BLD: 10 % (ref 12–49)
MAGNESIUM SERPL-MCNC: 1.7 MG/DL (ref 1.6–2.4)
MCH RBC QN AUTO: 24.6 PG (ref 26–34)
MCHC RBC AUTO-ENTMCNC: 30.8 G/DL (ref 30–36.5)
MCV RBC AUTO: 80.1 FL (ref 80–99)
MONOCYTES # BLD: 1.1 K/UL (ref 0–1)
MONOCYTES NFR BLD: 8 % (ref 5–13)
NEUTS SEG # BLD: 10.3 K/UL (ref 1.8–8)
NEUTS SEG NFR BLD: 80 % (ref 32–75)
NRBC # BLD: 0 K/UL (ref 0–0.01)
NRBC BLD-RTO: 0 PER 100 WBC
PLATELET # BLD AUTO: 331 K/UL (ref 150–400)
PMV BLD AUTO: 10.7 FL (ref 8.9–12.9)
POTASSIUM SERPL-SCNC: 3.4 MMOL/L (ref 3.5–5.1)
PROT SERPL-MCNC: 5 G/DL (ref 6.4–8.2)
RBC # BLD AUTO: 3.41 M/UL (ref 3.8–5.2)
SODIUM SERPL-SCNC: 134 MMOL/L (ref 136–145)
TIBC SERPL-MCNC: 172 UG/DL (ref 250–450)
TROPONIN I SERPL-MCNC: 0.22 NG/ML
VIT B12 SERPL-MCNC: 1456 PG/ML (ref 193–986)
WBC # BLD AUTO: 13 K/UL (ref 3.6–11)

## 2021-05-12 PROCEDURE — 36415 COLL VENOUS BLD VENIPUNCTURE: CPT

## 2021-05-12 PROCEDURE — 74011250636 HC RX REV CODE- 250/636: Performed by: INTERNAL MEDICINE

## 2021-05-12 PROCEDURE — 77030040395 HC NDL BIOP COAX INTRO MRTM -B

## 2021-05-12 PROCEDURE — 82728 ASSAY OF FERRITIN: CPT

## 2021-05-12 PROCEDURE — 0KBF3ZX EXCISION OF RIGHT TRUNK MUSCLE, PERCUTANEOUS APPROACH, DIAGNOSTIC: ICD-10-PCS | Performed by: RADIOLOGY

## 2021-05-12 PROCEDURE — 88360 TUMOR IMMUNOHISTOCHEM/MANUAL: CPT

## 2021-05-12 PROCEDURE — 99222 1ST HOSP IP/OBS MODERATE 55: CPT | Performed by: THORACIC SURGERY (CARDIOTHORACIC VASCULAR SURGERY)

## 2021-05-12 PROCEDURE — 86301 IMMUNOASSAY TUMOR CA 19-9: CPT

## 2021-05-12 PROCEDURE — 82746 ASSAY OF FOLIC ACID SERUM: CPT

## 2021-05-12 PROCEDURE — 93306 TTE W/DOPPLER COMPLETE: CPT

## 2021-05-12 PROCEDURE — 83540 ASSAY OF IRON: CPT

## 2021-05-12 PROCEDURE — 65660000000 HC RM CCU STEPDOWN

## 2021-05-12 PROCEDURE — 84484 ASSAY OF TROPONIN QUANT: CPT

## 2021-05-12 PROCEDURE — 85025 COMPLETE CBC W/AUTO DIFF WBC: CPT

## 2021-05-12 PROCEDURE — 88305 TISSUE EXAM BY PATHOLOGIST: CPT

## 2021-05-12 PROCEDURE — 74011000258 HC RX REV CODE- 258: Performed by: INTERNAL MEDICINE

## 2021-05-12 PROCEDURE — 99233 SBSQ HOSP IP/OBS HIGH 50: CPT | Performed by: INTERNAL MEDICINE

## 2021-05-12 PROCEDURE — 77030014115

## 2021-05-12 PROCEDURE — 82378 CARCINOEMBRYONIC ANTIGEN: CPT

## 2021-05-12 PROCEDURE — 74011250636 HC RX REV CODE- 250/636: Performed by: FAMILY MEDICINE

## 2021-05-12 PROCEDURE — 82607 VITAMIN B-12: CPT

## 2021-05-12 PROCEDURE — 88374 M/PHMTRC ALYS ISHQUANT/SEMIQ: CPT

## 2021-05-12 PROCEDURE — 88342 IMHCHEM/IMCYTCHM 1ST ANTB: CPT

## 2021-05-12 PROCEDURE — 74011250637 HC RX REV CODE- 250/637: Performed by: FAMILY MEDICINE

## 2021-05-12 PROCEDURE — P9047 ALBUMIN (HUMAN), 25%, 50ML: HCPCS | Performed by: INTERNAL MEDICINE

## 2021-05-12 PROCEDURE — 76942 ECHO GUIDE FOR BIOPSY: CPT

## 2021-05-12 PROCEDURE — 83735 ASSAY OF MAGNESIUM: CPT

## 2021-05-12 PROCEDURE — 86304 IMMUNOASSAY TUMOR CA 125: CPT

## 2021-05-12 PROCEDURE — 88333 PATH CONSLTJ SURG CYTO XM 1: CPT

## 2021-05-12 PROCEDURE — 80053 COMPREHEN METABOLIC PANEL: CPT

## 2021-05-12 PROCEDURE — 88341 IMHCHEM/IMCYTCHM EA ADD ANTB: CPT

## 2021-05-12 PROCEDURE — 74011250637 HC RX REV CODE- 250/637: Performed by: INTERNAL MEDICINE

## 2021-05-12 RX ORDER — LEVETIRACETAM 500 MG/1
500 TABLET ORAL 2 TIMES DAILY
Status: DISCONTINUED | OUTPATIENT
Start: 2021-05-12 | End: 2021-05-14 | Stop reason: HOSPADM

## 2021-05-12 RX ORDER — MEGESTROL ACETATE 40 MG/1
40 TABLET ORAL DAILY
Status: DISCONTINUED | OUTPATIENT
Start: 2021-05-12 | End: 2021-05-14 | Stop reason: HOSPADM

## 2021-05-12 RX ORDER — ALBUMIN HUMAN 250 G/1000ML
25 SOLUTION INTRAVENOUS EVERY 6 HOURS
Status: DISCONTINUED | OUTPATIENT
Start: 2021-05-12 | End: 2021-05-14 | Stop reason: HOSPADM

## 2021-05-12 RX ORDER — DEXAMETHASONE SODIUM PHOSPHATE 4 MG/ML
4 INJECTION, SOLUTION INTRA-ARTICULAR; INTRALESIONAL; INTRAMUSCULAR; INTRAVENOUS; SOFT TISSUE EVERY 6 HOURS
Status: DISCONTINUED | OUTPATIENT
Start: 2021-05-12 | End: 2021-05-14

## 2021-05-12 RX ORDER — LIDOCAINE HYDROCHLORIDE 10 MG/ML
10 INJECTION, SOLUTION EPIDURAL; INFILTRATION; INTRACAUDAL; PERINEURAL
Status: COMPLETED | OUTPATIENT
Start: 2021-05-12 | End: 2021-05-12

## 2021-05-12 RX ADMIN — ALBUMIN (HUMAN) 25 G: 0.25 INJECTION, SOLUTION INTRAVENOUS at 08:47

## 2021-05-12 RX ADMIN — HEPARIN SODIUM 5000 UNITS: 5000 INJECTION INTRAVENOUS; SUBCUTANEOUS at 03:34

## 2021-05-12 RX ADMIN — SODIUM CHLORIDE 100 ML/HR: 9 INJECTION, SOLUTION INTRAVENOUS at 03:34

## 2021-05-12 RX ADMIN — LIDOCAINE HYDROCHLORIDE 10 ML: 10 INJECTION, SOLUTION EPIDURAL; INFILTRATION; INTRACAUDAL; PERINEURAL at 11:22

## 2021-05-12 RX ADMIN — Medication 10 ML: at 21:56

## 2021-05-12 RX ADMIN — MEGESTROL ACETATE 40 MG: 40 TABLET ORAL at 17:22

## 2021-05-12 RX ADMIN — ACETAMINOPHEN 650 MG: 325 TABLET ORAL at 12:05

## 2021-05-12 RX ADMIN — ASPIRIN 81 MG: 81 TABLET, CHEWABLE ORAL at 08:47

## 2021-05-12 RX ADMIN — HEPARIN SODIUM 5000 UNITS: 5000 INJECTION INTRAVENOUS; SUBCUTANEOUS at 11:43

## 2021-05-12 RX ADMIN — DEXAMETHASONE SODIUM PHOSPHATE 4 MG: 4 INJECTION, SOLUTION INTRAMUSCULAR; INTRAVENOUS at 14:48

## 2021-05-12 RX ADMIN — PIPERACILLIN AND TAZOBACTAM 3.38 G: 3; .375 INJECTION, POWDER, LYOPHILIZED, FOR SOLUTION INTRAVENOUS at 08:47

## 2021-05-12 RX ADMIN — DEXAMETHASONE SODIUM PHOSPHATE 4 MG: 4 INJECTION, SOLUTION INTRAMUSCULAR; INTRAVENOUS at 21:56

## 2021-05-12 RX ADMIN — ALBUMIN (HUMAN) 25 G: 0.25 INJECTION, SOLUTION INTRAVENOUS at 11:43

## 2021-05-12 RX ADMIN — HEPARIN SODIUM 5000 UNITS: 5000 INJECTION INTRAVENOUS; SUBCUTANEOUS at 19:28

## 2021-05-12 RX ADMIN — DEXAMETHASONE SODIUM PHOSPHATE 4 MG: 4 INJECTION, SOLUTION INTRAMUSCULAR; INTRAVENOUS at 08:47

## 2021-05-12 RX ADMIN — LEVETIRACETAM 500 MG: 500 TABLET ORAL at 17:22

## 2021-05-12 RX ADMIN — ALBUMIN (HUMAN) 25 G: 0.25 INJECTION, SOLUTION INTRAVENOUS at 17:22

## 2021-05-12 NOTE — PROGRESS NOTES
Hospitalist Progress Note      Hospital summary: Jhonny Bolden is a 70 y.o. female who presents with cough.    History was primarily obtained from the patient.    Patient reports that about a few months back she started noticing a big lump appearing on her left biceps. Patient not Evaluated by Primary Care Physician Who Ordered an MRI of the Lung but She Could Not have MRI done after multiple trials due to claustrophobia. Patient states that she was diagnosed with bronchitis recently, finished Z-Leonel approximately, patient reports that she has had some hemoptysis associated with her symptoms. Patient reports that she has had some fullness in her chest and feels like every time she swallows \"something is there\". Patient reports that she has had some choking sensation with swallowing liquids. Patient reports that she has seen Dr. Brie Varghese) for the mass but could not get an MRI done due to claustrophobia. Patient denies any other complaints or problems.   5/11/2021      Assessment/Plan:  Metastatic cancer - unknown primary. Appears aggressive   Right upper lobe lung mass / Colon mass/ Breast mass/ brain mass/ LUE mass  Pulmonary nodules/ Metastasis to the ribs/ Metastasis to the adrenal glands  -Intra-atrial septal cardiac mass - not shown on  Echo   - pt had hemoptysis. - CT chest: Large right upper lobe mass extending to the right hilum. There is a lesion of the right hilum and right side of the mediastinum. There is a small area of local invasion in the anterolateral right second rib. Several metastatic lymph nodes are seen in the mediastinum. Multiple right-sided pulmonary nodules likely representing metastases. Destructive osseous metastases in the right lateral fifth rib, right T10 vertebral body, and left glenoid  1.5 cm nodule in the inferior right breast  4.7 x 3.9 cm mass in the left upper quadrant attached to the left colon.   Nodular thickening of the bilateral adrenal glands worrisome for metastases  - CT head:  Left frontal mass with surrounding vasogenic edema resulting in mass effect on the left lateral ventricle and approximately 7 mm left to right subfalcine herniation  - started on IV decadron and keppra  - Oncology on board  - NSGY consulted  - appreciate CTS input \" Likely not surgical candidate\"  - US guided biopsy of right paraspinal lesion done on 5/12    Hyponatremia - improving  Hypokalemia - replaced  - will monitor     Leukocytosis - likely reactive - improving  - will hold off abx     Anemia  - iron profile and fobt ordered  - will monitor hb, transfuse <7    Blood pressure - soft  - on IVF and IV albumin  - will monitor    Nutrition  - poor appetite  - started on megace   - added nutritional supplements     Palliative care consulted     Code status: full  DVT prophylaxis: heparin   Disposition: TBD. Home when ready in 1-2 days   ----------------------------------------------    CC: LUE mass     S: patient is seen and examined at bedside. Daughter present. She just had bx done this AM. Denied any pain. Extensive discussion on CT chest/ abd/ head findings. Hopeful biopsy will guide us the primary cancer. But expressed that it appears very aggressive - only palliative treatment if available. They understood. Pt does not want any surgery. Discussed with nursing     Review of Systems:  A comprehensive review of systems was negative. O:  Visit Vitals  BP (!) 94/41 (BP 1 Location: Right arm, BP Patient Position: At rest)   Pulse 72   Temp 97.8 °F (36.6 °C)   Resp 18   Ht 5' 2\" (1.575 m)   Wt 53.5 kg (118 lb)   SpO2 98%   BMI 21.58 kg/m²       PHYSICAL EXAM:  Gen: NAD  HEENT: anicteric sclerae, normal conjunctiva, oropharynx clear, MM moist  Neck: supple, trachea midline, no adenopathy  Heart: RRR, no MRG, no JVD, no peripheral edema  Lungs: decreased breath sounds  Abd: soft, NT, ND, BS+, no organomegaly  Extr: warm.  LUE mass 7-8cm   Skin: dry, no rash  Neuro: CN II-XII grossly intact, normal speech, moves all extremities  Psych: normal mood, appropriate affect      Intake/Output Summary (Last 24 hours) at 5/12/2021 1615  Last data filed at 5/12/2021 0335  Gross per 24 hour   Intake 1687.08 ml   Output    Net 1687.08 ml        Recent labs & imaging reviewed:  Recent Results (from the past 24 hour(s))   CULTURE, BLOOD, PAIRED    Collection Time: 05/11/21  5:53 PM    Specimen: Blood   Result Value Ref Range    Special Requests: NO SPECIAL REQUESTS      Culture result: NO GROWTH AFTER 10 HOURS     LACTIC ACID    Collection Time: 05/11/21  5:53 PM   Result Value Ref Range    Lactic acid 1.5 0.4 - 2.0 MMOL/L   SAMPLES BEING HELD    Collection Time: 05/11/21  5:53 PM   Result Value Ref Range    SAMPLES BEING HELD 1PST     COMMENT        Add-on orders for these samples will be processed based on acceptable specimen integrity and analyte stability, which may vary by analyte. TROPONIN I    Collection Time: 05/12/21  3:50 AM   Result Value Ref Range    Troponin-I, Qt. 0.22 (H) <0.70 ng/mL   METABOLIC PANEL, COMPREHENSIVE    Collection Time: 05/12/21  3:50 AM   Result Value Ref Range    Sodium 134 (L) 136 - 145 mmol/L    Potassium 3.4 (L) 3.5 - 5.1 mmol/L    Chloride 102 97 - 108 mmol/L    CO2 24 21 - 32 mmol/L    Anion gap 8 5 - 15 mmol/L    Glucose 105 (H) 65 - 100 mg/dL    BUN 9 6 - 20 MG/DL    Creatinine 0.43 (L) 0.55 - 1.02 MG/DL    BUN/Creatinine ratio 21 (H) 12 - 20      GFR est AA >60 >60 ml/min/1.73m2    GFR est non-AA >60 >60 ml/min/1.73m2    Calcium 8.2 (L) 8.5 - 10.1 MG/DL    Bilirubin, total 0.2 0.2 - 1.0 MG/DL    ALT (SGPT) 19 12 - 78 U/L    AST (SGOT) 21 15 - 37 U/L    Alk.  phosphatase 107 45 - 117 U/L    Protein, total 5.0 (L) 6.4 - 8.2 g/dL    Albumin 2.0 (L) 3.5 - 5.0 g/dL    Globulin 3.0 2.0 - 4.0 g/dL    A-G Ratio 0.7 (L) 1.1 - 2.2     CBC WITH AUTOMATED DIFF    Collection Time: 05/12/21  3:50 AM   Result Value Ref Range    WBC 13.0 (H) 3.6 - 11.0 K/uL    RBC 3.41 (L) 3.80 - 5.20 M/uL    HGB 8.4 (L) 11.5 - 16.0 g/dL    HCT 27.3 (L) 35.0 - 47.0 %    MCV 80.1 80.0 - 99.0 FL    MCH 24.6 (L) 26.0 - 34.0 PG    MCHC 30.8 30.0 - 36.5 g/dL    RDW 13.8 11.5 - 14.5 %    PLATELET 012 136 - 179 K/uL    MPV 10.7 8.9 - 12.9 FL    NRBC 0.0 0  WBC    ABSOLUTE NRBC 0.00 0.00 - 0.01 K/uL    NEUTROPHILS 80 (H) 32 - 75 %    LYMPHOCYTES 10 (L) 12 - 49 %    MONOCYTES 8 5 - 13 %    EOSINOPHILS 1 0 - 7 %    BASOPHILS 0 0 - 1 %    IMMATURE GRANULOCYTES 1 (H) 0.0 - 0.5 %    ABS. NEUTROPHILS 10.3 (H) 1.8 - 8.0 K/UL    ABS. LYMPHOCYTES 1.3 0.8 - 3.5 K/UL    ABS. MONOCYTES 1.1 (H) 0.0 - 1.0 K/UL    ABS. EOSINOPHILS 0.2 0.0 - 0.4 K/UL    ABS. BASOPHILS 0.1 0.0 - 0.1 K/UL    ABS. IMM. GRANS. 0.1 (H) 0.00 - 0.04 K/UL    DF AUTOMATED     MAGNESIUM    Collection Time: 05/12/21  3:50 AM   Result Value Ref Range    Magnesium 1.7 1.6 - 2.4 mg/dL   ECHO ADULT COMPLETE    Collection Time: 05/12/21  2:28 PM   Result Value Ref Range    IVSd 1.04 (A) 0.60 - 0.90 cm    LVIDd 4.05 3.90 - 5.30 cm    LVIDs 2.45 cm    LVOT d 1.95 cm    LVPWd 0.98 (A) 0.60 - 0.90 cm    Pulmonic Valve Systolic Peak Instantaneous Gradient 2.08 mmHg    Triscuspid Valve Regurgitation Peak Gradient 25.02 mmHg    TR Max Velocity 250.10 cm/s    Ao Root D 3.27 cm    LV Mass .4 67.0 - 162.0 g    LV Mass AL Index 86.0 43.0 - 95.0 g/m2     Recent Labs     05/12/21  0350 05/11/21  1138   WBC 13.0* 15.9*   HGB 8.4* 10.8*   HCT 27.3* 34.6*    393     Recent Labs     05/12/21  0350 05/11/21  1138   * 131*   K 3.4* 3.4*    98   CO2 24 27   BUN 9 13   CREA 0.43* 0.60   * 171*   CA 8.2* 9.5   MG 1.7  --      Recent Labs     05/12/21 0350 05/11/21  1138   ALT 19 23    134*   TBILI 0.2 0.3   TP 5.0* 6.4   ALB 2.0* 2.6*   GLOB 3.0 3.8     No results for input(s): INR, PTP, APTT, INREXT in the last 72 hours. No results for input(s): FE, TIBC, PSAT, FERR in the last 72 hours.    No results found for: FOL, RBCF   No results for input(s): PH, PCO2, PO2 in the last 72 hours.   Recent Labs     05/12/21  0350 05/11/21  1138   TROIQ 0.22* 0.15*     No results found for: CHOL, CHOLX, CHLST, CHOLV, HDL, HDLP, LDL, LDLC, DLDLP, TGLX, TRIGL, TRIGP, CHHD, CHHDX  No results found for: GLUCPOC  No results found for: COLOR, APPRN, SPGRU, REFSG, JEAN PIERRE, PROTU, GLUCU, KETU, BILU, UROU, HEATHER, LEUKU, GLUKE, EPSU, BACTU, WBCU, RBCU, CASTS, UCRY    Med list reviewed  Current Facility-Administered Medications   Medication Dose Route Frequency    albumin human 25% (BUMINATE) solution 25 g  25 g IntraVENous Q6H    dexamethasone (DECADRON) 4 mg/mL injection 4 mg  4 mg IntraVENous Q6H    levETIRAcetam (KEPPRA) tablet 500 mg  500 mg Oral BID    megestroL (MEGACE) tablet 40 mg  40 mg Oral DAILY    0.9% sodium chloride infusion  75 mL/hr IntraVENous CONTINUOUS    sodium chloride (NS) flush 5-40 mL  5-40 mL IntraVENous Q8H    sodium chloride (NS) flush 5-40 mL  5-40 mL IntraVENous PRN    acetaminophen (TYLENOL) tablet 650 mg  650 mg Oral Q4H PRN    heparin (porcine) injection 5,000 Units  5,000 Units SubCUTAneous Q8H    albuterol (PROVENTIL VENTOLIN) nebulizer solution 2.5 mg  2.5 mg Nebulization Q4H PRN    aspirin chewable tablet 81 mg  81 mg Oral DAILY       Care Plan discussed with:  Patient/Family, Nurse and     Jarvis Lauren MD  Internal Medicine  Date of Service: 5/12/2021

## 2021-05-12 NOTE — PROGRESS NOTES
Spiritual Care Assessment/Progress Note  [facility]      NAME: Jackson Henderson      MRN: [id number]  AGE: 70 y.o. SEX: female  Jain Affiliation: Non Oriental orthodox   Language: English     5/12/2021           Spiritual Assessment begun in [address] [facility] through conversation with:         []Patient        [x] Family    [] Friend(s)        Reason for Consult: Palliative Care, Initial/Spiritual Assessment     Spiritual beliefs: (Please include comment if needed)     [x] Identifies with a cinthya tradition:         [] Supported by a cinthya community:            [] Claims no spiritual orientation:           [] Seeking spiritual identity:                [] Adheres to an individual form of spirituality:           [] Not able to assess:                           Identified resources for coping:      [x] Prayer                               [] Music                  [] Guided Imagery     [x] Family/friends                 [] Pet visits     [] Devotional reading                         [] Unknown     [] Other:                                               Interventions offered during this visit: (See comments for more details)    Patient Interventions: Initial/Spiritual assessment, patient floor     Family/Friend(s):  Affirmation of emotions/emotional suffering, Catharsis/review of pertinent events in supportive environment, Prayer (assurance of), Initial Assessment     Plan of Care:     [x] Support spiritual and/or cultural needs    [] Support AMD and/or advance care planning process      [] Support grieving process   [] Coordinate Rites and/or Rituals    [] Coordination with community clergy   [] No spiritual needs identified at this time   [] Detailed Plan of Care below (See Comments)  [] Make referral to Music Therapy  [] Make referral to Pet Therapy     [] Make referral to Addiction services  [] Make referral to [facility]  [] Make referral to Spiritual Care Partner  [] No future visits requested        [x] Follow up upon further referrals     Comments: Attempted to visit Ms Shawanda Sinha in room 444; patient was not in her room. Patient's daughter was there and shared that Ms Shawanda Sinha had just been taken for a test.    Provided pastoral presence and active listening as daughter shared that she thought Ms Shawanda Sinha was doing much better today than when initially admitted to [facility] [place]. She expressed no concerns at that time and accepted 's offer to keep them in prayer. Assured her of ongoing  availability for support. Daughter expressed great appreciation for visit. : Rev. Maggi Mims.  Adele Gutierres; [facility], to contact [phone] call: [phone]

## 2021-05-12 NOTE — CARDIO/PULMONARY
Cardiac Rehab: Per EMR, patient is a current smoker.  Smoking Cessation Program information placed on the AVS.   
Marcelle Elias RN

## 2021-05-12 NOTE — PROGRESS NOTES
Reason for Admission:  Right upper lobe lung mass- multiple masses identified- breast nodule, colon mass, etc.                      RUR Score:  11% risk of re-admission                    Plan for utilizing home health:    TBD- will monitor, patient was not receiving services prior to admission- would benefit from PT/OT evaluations       PCP: First and Last name:  Chhaya Espitia NP     Name of Practice: [address] [place] [address] Yazmin    Are you a current patient: Yes/No: Yes   Approximate date of last visit: Within the past year    Can you participate in a virtual visit with your PCP: N/A                    Current Advanced Directive/Advance Care Plan: Full Code      Healthcare Decision Maker:   Click here to complete [address] including selection of the Healthcare Decision Maker Relationship (ie \"Primary\") Patient does not have AMD, however, she verbally is deferring to spouse and daughter Catherine Macias present at bedside and supportive            Transition of Care Plan:   TBD- PT/OT evaluations would be beneficial, has numerous specialty consultations pending. The CM met with the patient and her daughter Jacy Jean-Baptiste at bedside in order to introduce the role of CM and assess for patient needs. The patient lives at home with her  in two-story home, however, has 1st floor living (basement). The patient's daughter Jacy Jean-Baptiste lives a less than a mile away. The patient and daughter verified insurance information. Prior to admission, the patient was independent with ADLs and mobility, no DME utilized in-the-home. The patient utilizes  for prescriptions, family to transport home. The CM will follow for transitions of care, consider therapy consultations. BRENT Michaud    Care Management Interventions  PCP Verified by CM: Yes(Patient verified PCP as Sandi Grullon NP)  Mode of Transport at Discharge:  Other (see comment)(Family to transport )  Transition of Care Consult (CM Consult): Discharge Planning  Physical Therapy Consult: No(Would benefit from PT consultation )  Occupational Therapy Consult: No(Would benefit from OT consutlation )  Current Support Network: Own Home, Lives with Spouse, Family Lives Nearby  Confirm Follow Up Transport: Family  Discharge Location  Discharge Placement: Home(Would benefit from PT/OT consultations, monitor for needs)

## 2021-05-12 NOTE — PROGRESS NOTES
Neurosurgery consult received   MRI with contrast would be test of choice next  If it can not be done then CT with contrast of the brain  Full note to follow after I have seen pt later today  Will also await biopsy results since it could direct care of metastatic disease as well

## 2021-05-12 NOTE — DISCHARGE INSTRUCTIONS
Smoking Cessation Program: This is a free, phone/text/email based, smoking cessation program. The program is individualized to meet each patient's needs. To enroll use the link - [facility]. [facility]/quit or text Bobby Estrada to [phone] from any smart phone.

## 2021-05-12 NOTE — CONSULTS
[address]    Name:  Yovana Han  MR#:  [id number]  :  1945  ACCOUNT #:  [de-identified]  DATE OF SERVICE:  2021    CHIEF COMPLAINT:  A 79-year-old woman with newly diagnosed left frontal metastatic brain tumor with a large lung mass. HISTORY OF PRESENT ILLNESS:  The patient has a new diagnosis of lung cancer, presents with a lesion in the left arm. She also has imaging studies that reveal widespread metastatic disease with a large right upper lobe lung mass. PAST MEDICAL HISTORY:  COPD. MEDICATIONS:  Include,  1. Prilosec. 2.  Ambien. 3.  Lipitor. ALLERGIES:  NO KNOWN DRUG ALLERGIES. REVIEW OF SYSTEMS:  No other GI, , respiratory, cardiac, dermatologic, neurologic, psychiatric, endocrinologic, or hematologic complaints. PHYSICAL EXAMINATION:  VITAL SIGNS:  On admission, blood pressure was 111/58, pulse rate 77, temperature 99.6. NEUROLOGIC:  She is awake, alert, cooperative. Moves all four extremities. Pupils equal and reactive. Extraocular muscles intact. Face is symmetric. Palate rises equally. Tongue protrudes midline. Gait and station are deferred. Cerebellar coordination is deferred. Imaging studies include a CT scan without contrast that shows a low-density lesion in the left frontal area consistent with a frontal mass. The test of choice would be an MRI with contrast, if that is not possible because of the patient's history of claustrophobia then a CT with contrast would be the next best thing. However, the patient has widespread metastatic disease and I am not sure at this point if surgical intervention is going to change the course of the disease process. I agree that she needs a biopsy. It appears that the arm lesion is probably the most easily accessible and that will help drive the further care based on the findings of the biopsy.   Therefore, I will defer care to Hematology/Oncology and the hospitalist, and if needed surgical resection of the brain tumor could be accomplished. Thank you for asking me to see the patient.       Darwin Odell MD      [id number]  D:  05/12/2021 16:34  T:  05/12/2021 17:32  JOB #:  [id number]  CC:  Fani Sanon MD

## 2021-05-12 NOTE — PROGRESS NOTES
[facility] [place] [address] [address], [address]  W: [phone]  F: [phone]    Reason for Visit:   Ileana Perea is a 70 y.o. female who is seen in hospital/ ER at the request of Dr. Wendy Chapin for evaluation of abnormal CTs. Treatment History:   None from us yet    History of Present Illness:     Pt seen today in ER for abnormal CTs with likely metastatic cancer. No known cancer dx. No tissue dx of cancer. Pt being admitted for zhang. Pt is in bed getting ECHO at my visit. Daughter at bedside   Pt comfortable. C/o LUE pain. No fevers/ chills/ chest pain/ SOB/ nausea/ vomiting/diarrhea/    Past Medical History:   Diagnosis Date    Chronic obstructive pulmonary disease ([address]     Ill-defined condition     tumor in arm      History reviewed. No pertinent surgical history. Social History     Tobacco Use    Smoking status: Not on file   Substance Use Topics    Alcohol use: Not on file      History reviewed. No pertinent family history.   Current Facility-Administered Medications   Medication Dose Route Frequency    albumin human 25% (BUMINATE) solution 25 g  25 g IntraVENous Q6H    dexamethasone (DECADRON) 4 mg/mL injection 4 mg  4 mg IntraVENous Q6H    levETIRAcetam (KEPPRA) tablet 500 mg  500 mg Oral BID    megestroL (MEGACE) tablet 40 mg  40 mg Oral DAILY    0.9% sodium chloride infusion  75 mL/hr IntraVENous CONTINUOUS    sodium chloride (NS) flush 5-40 mL  5-40 mL IntraVENous Q8H    sodium chloride (NS) flush 5-40 mL  5-40 mL IntraVENous PRN    acetaminophen (TYLENOL) tablet 650 mg  650 mg Oral Q4H PRN    heparin (porcine) injection 5,000 Units  5,000 Units SubCUTAneous Q8H    albuterol (PROVENTIL VENTOLIN) nebulizer solution 2.5 mg  2.5 mg Nebulization Q4H PRN    aspirin chewable tablet 81 mg  81 mg Oral DAILY      No Known Allergies     Review of Systems: A complete review of systems was obtained, negative except as described above.    Physical Exam:     Visit Vitals  BP (!) 94/41 (BP 1 Location: Right arm, BP Patient Position: At rest)   Pulse 72   Temp 97.8 °F (36.6 °C)   Resp 18   Ht 5' 2\" (1.575 m)   Wt 118 lb (53.5 kg)   SpO2 98%   BMI 21.58 kg/m²     ECOG PS: 1  General: No distress  Eyes: PERRLA, anicteric sclerae  HENT: Atraumatic  Neck: Supple  Respiratory: CTAB, normal respiratory effort  CV: Normal rate, regular rhythm  GI: Soft, nontender, nondistended  MS: in bed moving extremities  Skin: No rashes, ecchymoses, or petechiae. Normal temperature, turgor, and texture. Psych: Alert, oriented, appropriate affect, normal judgment/insight  Mass on LUE extremity  Breast no masses on LEFT, right mass outer mid quadrant, mobile about 1.5 cm    Results:     Lab Results   Component Value Date/Time    WBC 13.0 (H) 05/12/2021 03:50 AM    HGB 8.4 (L) 05/12/2021 03:50 AM    HCT 27.3 (L) 05/12/2021 03:50 AM    PLATELET 516 98/68/3511 03:50 AM    MCV 80.1 05/12/2021 03:50 AM    ABS. NEUTROPHILS 10.3 (H) 05/12/2021 03:50 AM     Lab Results   Component Value Date/Time    Sodium 134 (L) 05/12/2021 03:50 AM    Potassium 3.4 (L) 05/12/2021 03:50 AM    Chloride 102 05/12/2021 03:50 AM    CO2 24 05/12/2021 03:50 AM    Glucose 105 (H) 05/12/2021 03:50 AM    BUN 9 05/12/2021 03:50 AM    Creatinine 0.43 (L) 05/12/2021 03:50 AM    GFR est AA >60 05/12/2021 03:50 AM    GFR est non-AA >60 05/12/2021 03:50 AM    Calcium 8.2 (L) 05/12/2021 03:50 AM     Lab Results   Component Value Date/Time    Bilirubin, total 0.2 05/12/2021 03:50 AM    ALT (SGPT) 19 05/12/2021 03:50 AM    Alk. phosphatase 107 05/12/2021 03:50 AM    Protein, total 5.0 (L) 05/12/2021 03:50 AM    Albumin 2.0 (L) 05/12/2021 03:50 AM    Globulin 3.0 05/12/2021 03:50 AM       CT Results (most recent):  Results from Hospital Encounter encounter on 05/11/21   CT HEAD WO CONT    Narrative EXAMINATION:  CT HEAD WO CONT    CLINICAL INFORMATION:  metastatic disease  COMPARISON:  None.    TECHNIQUE: Routine axial head CT was performed. IV contrast was not  administered. Sagittal and coronal reconstructions were generated. CT dose reduction was achieved through use of a standardized protocol tailored  for this examination and automatic exposure control for dose modulation. FINDINGS:    VENTRICULAR SYSTEM:  Normal for age. BASAL CISTERNS:  Patent. BRAIN PARENCHYMA:  Left frontal vasogenic edema associated with an anterior  frontal mass which is approximately 2.2 x 1.9 x 2.2 cm in size possible adjacent  left frontal mass, not clearly delineated. No other discrete mass is seen on  noncontrast imaging although right occipital and right frontal areas of mild  vasogenic edema may be due to adjacent metastatic disease. No intracranial  hemorrhage or acute infarct. MIDLINE SHIFT:  7 mm left-to-right subfalcine herniation. CALVARIUM/ SKULL BASE: No destructive lesion. Probable venous lake in the right  frontoparietal calvarium. PARANASAL SINUSES AND MASTOID AIR CELLS: Clear. VISUALIZED ORBITS: No significant abnormalities. SELLA: No enlargement. Impression Left frontal mass with surrounding vasogenic edema resulting in mass effect on  the left lateral ventricle and approximately 7 mm left to right subfalcine  herniation. No other discrete mass is demonstrated although foci of vasogenic  edema suggest other areas of intracranial metastatic disease. MRI is the optimal examination for detection of intracranial metastatic disease. Records reviewed and summarized above. Pathology report(s) reviewed above. Radiology report(s) reviewed above. Assessment/PLAN:     1)  Lung mass/ colon mass/bone mets/ breast mass/ large LUE mass  Found on CT. Presumed metastatic cancer. CT head shows mets. Needs brain MRI. Had biopsy today of unclear site. Await path. Biopsy may give us site of origin which will help direct therapy, along with molecular testing.    Pt does have a RIGHT breast mass on exam and can do mammo/ ultrasound/ possible biopsy if needed. Pt clinically better today. Reviewed all with pt and daughter at bedside. Discussed possible cancer that has spread. Pt is interested in treatment options. 2) brain mets on CT. Needs brain MRI. On decadron/ keppra. 3) breast mass on exam. Can do mammo/ ultrasound biopsy if needed for tissue. 4) anemia. Drop from 10 to 8. Monitor. Check iron levels since has colon mass. 5) colon mass. Can do colo if path needed. 6) psychosocial.  Mood good. Daughter at bedside and supportive. Reviewed all with her today. We will follow  Call if questions    I appreciate the opportunity to participate in [facility]. [facility].     Signed By: Eliezer Ayala DO

## 2021-05-12 NOTE — PROGRESS NOTES
Problem: Falls - Risk of  Goal: *Absence of Falls  Description: Document Dannielle Cheema Fall Risk and appropriate interventions in the flowsheet. Outcome: Progressing Towards Goal  Note: Fall Risk Interventions:  Mobility Interventions: Communicate number of staff needed for ambulation/transfer              Elimination Interventions:  Toileting schedule/hourly rounds, Patient to call for help with toileting needs    History of Falls Interventions: Door open when patient unattended, Room close to nurse's station         Problem: Patient Education: Go to Patient Education Activity  Goal: Patient/Family Education  Outcome: Progressing Towards Goal     Problem: General Medical Care Plan  Goal: *Vital signs within specified parameters  Outcome: Progressing Towards Goal  Goal: *Labs within defined limits  Outcome: Progressing Towards Goal  Goal: *Absence of infection signs and symptoms  Outcome: Progressing Towards Goal  Goal: *Optimal pain control at patient's stated goal  Outcome: Progressing Towards Goal  Goal: *Skin integrity maintained  Outcome: Progressing Towards Goal  Goal: *Fluid volume balance  Outcome: Progressing Towards Goal  Goal: *Optimize nutritional status  Outcome: Progressing Towards Goal  Goal: *Anxiety reduced or absent  Outcome: Progressing Towards Goal  Goal: *Progressive mobility and function (eg: ADL's)  Outcome: Progressing Towards Goal     Problem: Patient Education: Go to Patient Education Activity  Goal: Patient/Family Education  Outcome: Progressing Towards Goal

## 2021-05-12 NOTE — H&P
CSS   History and Physical    Subjective:      Johnathan Saeed is a 70 y.o. female with PMH significant for COPD who noticed a lump on her left biceps. She followed up with her PCP for this who ordered an MRI of that area but could not have this done X 2 due to claustrophobia. Over the last several months, she has also noted increased fatigue, shortness of breath, weight loss of 10 lbs without attempting, anorexia, chest fullness, difficulty swallowing, hemoptysis. She was then admitted to the hospital and CT showed RUL lung mass, Breast mass, LUE mass, Colon tumor, bone metastasis and a tumor in her septum for which Cardiac Surgery was consulted. Heme/Onc was also consulted for help in care planning. The patient is retired. She lives with her spouse and has a daughter who is involved. She is independent in all of her ADLs. Cardiac Testing    Cardiac catheterization: None    ECHO: Completed 05/12/21 Report pending    Past Medical History:   Diagnosis Date    Chronic obstructive pulmonary disease ([facility] [address]     Ill-defined condition     tumor in arm     History reviewed. No pertinent surgical history. Social History     Tobacco Use    Smoking status: Not on file   Substance Use Topics    Alcohol use: Not on file      History reviewed. No pertinent family history. Prior to Admission medications    Medication Sig Start Date End Date Taking? Authorizing Provider   omeprazole (PRILOSEC) 10 mg capsule 20 mg two (2) times a day. Yes Provider, Historical   zolpidem (Ambien) 10 mg tablet Take 12.5 mg by mouth nightly as needed for Sleep. Yes Provider, Historical   atorvastatin (LIPITOR) 20 mg tablet Take 20 mg by mouth nightly. Yes Provider, Historical       No Known Allergies       Review of Systems:   Consititutional: Denies fever or chills. +fatigue, +weight loss  Eyes:  Denies use of glasses or vision problems(cataracts). ENT:  Denies hearing. + swallowing difficulty. CV: Denies CP, claudication, HTN. +chest fullness  Resp: +dyspnea, +hemoptysis, +cough  : Denies dialysis or kidney problems. GI: Denies ulcers, esophageal strictures, liver problems. M/S: Denies joint or bone problems, or implanted artificial hardware. +Left arm mass  Skin: Denies varicose veins, edema. Neuro: Denies strokes, or TIAs. Psych: Denies anxiety or depression. Endocrine: Denies thyroid problems or diabetes. Heme/Lymphatic: Denies easy bruising or lymphedema. Objective:     VS:   Visit Vitals  BP (!) 111/58 (BP 1 Location: Right arm, BP Patient Position: At rest)   Pulse 77   Temp 99.6 °F (37.6 °C)   Resp 18   Ht 5' 2\" (1.575 m)   Wt 118 lb 2.7 oz (53.6 kg)   SpO2 100%   BMI 21.61 kg/m²         Physical Exam:    General appearance: alert, cooperative, no distress  Head: normocephalic, without obvious abnormality; atraumatic  Eyes: conjunctivae/corneas clear; EOM's intact. Nose: nares normal; no drainage. Neck: no carotid bruit and no JVD  Lungs: clear to auscultation bilaterally  Heart: regular rate and rhythm; no murmur  Abdomen: soft, non-tender; bowel sounds normal  Extremities: moves all extremities; no weakness. +Left biceps mass  Skin: Skin color normal; No varicose veins or edema. Neurologic: Grossly normal      Labs:   Recent Labs     05/12/21  0350   WBC 13.0*   HGB 8.4*   HCT 27.3*      *   K 3.4*   BUN 9   CREA 0.43*   *       Diagnostics:   PA and lateral:   CXR Results  (Last 48 hours)               05/11/21 1157  XR CHEST PA LAT Final result    Impression:      Round opacity in the right upper lobe and right hilar fullness are suspicious   for a lung mass and lymphadenopathy. Narrative:  EXAM:  XR CHEST PA LAT       INDICATION: Shortness of breath       COMPARISON: None       TECHNIQUE: PA and lateral chest views       FINDINGS: The cardiac silhouette is within normal limits. There is right hilar   fullness. The pulmonary vasculature is within normal limits.         There is a round opacity in the right upper lobe. The left lung and pleural   spaces are clear. There is no pneumothorax. There is mild dextroconvex curvature   of the upper thoracic spine. The upper abdomen is unremarkable. CTA Chest: 21  1. Large right upper lobe mass extending to the right hilum. There is a lesion  of the right hilum and right side of the mediastinum. There is a small area of  local invasion in the anterolateral right second rib. Several metastatic lymph  nodes are seen in the mediastinum. 2. Multiple right-sided pulmonary nodules likely representing metastases. 3. Small right pleural effusion. 4. Several nonspecific groundglass opacities in the left lung. 5. Destructive osseous metastases in the right lateral fifth rib, right T10  vertebral body, and left glenoid. 6. Incompletely seen mass in the left bicep muscle. 7. Enlarged lymph node in the left axilla. 8. 1.5 cm nodule in the inferior right breast.  9. Nodular thickening of the bilateral adrenal glands worrisome for metastases. 10. 4.7 x 3.9 cm mass in the left upper quadrant attached to the left colon. 11. 1.6 cm mass of the intra-atrial septum which may represent metastasis versus myxoma. [id number] 11:25 PM - Chuck, Card Result In    Component Value Ref Range & Units Status   Ventricular Rate 93  BPM Final   Atrial Rate 93  BPM Final   P-R Interval 128  ms Final   QRS Duration 76  ms Final   Q-T Interval 334  ms Final   QTC Calculation (Bezet) 415  ms Final   Calculated P Axis 56  degrees Final   Calculated R Axis -45  degrees Final   Calculated T Axis 24  degrees Final   Diagnosis   Final   Normal sinus rhythm       Assessment:     Active Problems:    SOB (shortness of breath) (2021)        Plan:       Treatment Plan:      1. 1.6 cm mass of the intra-atrial septum which may represent metastasis versus myxoma. Currently undergoing biopsy to determine site of origin and help direct therapy.  Echo pending from today. Likely not surgical candidate. 2. Lung mass/colon mass/bone mets/large LUE mass: Presumed metastatic disease. Heme/Onc following. 3. COPD: Currently on room air. PRN nebs. On Zosyn and Decadron    Other cares per primary and consulting teams. Signed By: Francisco Jordan NP     May 12, 2021      Saw patient. History and films reviewed. Patient seen by PA/NP and agree with above.    Findings/Conditions: intra-cardiac tumor  Plan of Care: repeat TTE    Saw patient, agree with above  Risk of morbidity and mortality - high  Medical decision making - high complexity

## 2021-05-12 NOTE — PROGRESS NOTES
2000 Report received from [facility]. Patient alert and oriented X4, resting quietly in bed. No needs expressed. 2128 Patient down to CT.  2158 Patient back from CT, juice and crackers provided. Call bell left within reach. 1417 Bedside shift change report given to Krista Gutiérrez by Hugo Geller RN. Report included the following information SBAR, Kardex, MAR, Accordion, and Recent Results and Cardiac Rhythm NSR. Writer called and left message for the patient that her script had been sent to the pharmacy, and she will need to come into the office to have some lab work done.

## 2021-05-12 NOTE — CONSULTS
Palliative Medicine Consult  Fan: [phone] ([phone])            Consult received and chart reviewed. Will follow up with patient Thursday 5/13/21 for palliative medicine consult. Mansi Matamoros, FNP-C

## 2021-05-12 NOTE — PROGRESS NOTES
Visited Ms Chele Segundo in room [id number]; patient had been out of her room when  visited earlier today. Ms Chele Segundo was lying quietly in bed and appeared in pretty good spirits. Provided pastoral presence and active listening as Ms Chele Segundo shared a little about her health concerns. Stated that she was waiting to hear the results of the many test that had been done. She expressed no other concerns. Acknowledged her feelings and offered words of support. Ms Chele Segundo stated that she was not associated with any particular [employer] and would appreciate being kept in prayer. Assured her of ongoing  availability for support. She expressed appreciation for visit. : Rev. Debbie Harrison; [facility], to contact [phone] [address] call: [phone]

## 2021-05-13 LAB
ALBUMIN SERPL-MCNC: 3.5 G/DL (ref 3.5–5)
ALBUMIN/GLOB SERPL: 1.3 {RATIO} (ref 1.1–2.2)
ALP SERPL-CCNC: 88 U/L (ref 45–117)
ALT SERPL-CCNC: 21 U/L (ref 12–78)
ANION GAP SERPL CALC-SCNC: 7 MMOL/L (ref 5–15)
AST SERPL-CCNC: 10 U/L (ref 15–37)
BASOPHILS # BLD: 0 K/UL (ref 0–0.1)
BASOPHILS NFR BLD: 0 % (ref 0–1)
BILIRUB SERPL-MCNC: 0.2 MG/DL (ref 0.2–1)
BUN SERPL-MCNC: 12 MG/DL (ref 6–20)
BUN/CREAT SERPL: 22 (ref 12–20)
CALCIUM SERPL-MCNC: 9.1 MG/DL (ref 8.5–10.1)
CANCER AG19-9 SERPL-ACNC: 1267 U/ML (ref 0–35)
CHLORIDE SERPL-SCNC: 102 MMOL/L (ref 97–108)
CO2 SERPL-SCNC: 26 MMOL/L (ref 21–32)
CREAT SERPL-MCNC: 0.55 MG/DL (ref 0.55–1.02)
DIFFERENTIAL METHOD BLD: ABNORMAL
EOSINOPHIL # BLD: 0 K/UL (ref 0–0.4)
EOSINOPHIL NFR BLD: 0 % (ref 0–7)
ERYTHROCYTE [DISTWIDTH] IN BLOOD BY AUTOMATED COUNT: 13.7 % (ref 11.5–14.5)
GLOBULIN SER CALC-MCNC: 2.7 G/DL (ref 2–4)
GLUCOSE SERPL-MCNC: 157 MG/DL (ref 65–100)
HCT VFR BLD AUTO: 24.4 % (ref 35–47)
HGB BLD-MCNC: 7.6 G/DL (ref 11.5–16)
IMM GRANULOCYTES # BLD AUTO: 0.1 K/UL (ref 0–0.04)
IMM GRANULOCYTES NFR BLD AUTO: 1 % (ref 0–0.5)
LYMPHOCYTES # BLD: 0.8 K/UL (ref 0.8–3.5)
LYMPHOCYTES NFR BLD: 9 % (ref 12–49)
MCH RBC QN AUTO: 24.8 PG (ref 26–34)
MCHC RBC AUTO-ENTMCNC: 31.1 G/DL (ref 30–36.5)
MCV RBC AUTO: 79.5 FL (ref 80–99)
MONOCYTES # BLD: 0.3 K/UL (ref 0–1)
MONOCYTES NFR BLD: 3 % (ref 5–13)
NEUTS SEG # BLD: 7.9 K/UL (ref 1.8–8)
NEUTS SEG NFR BLD: 87 % (ref 32–75)
NRBC # BLD: 0 K/UL (ref 0–0.01)
NRBC BLD-RTO: 0 PER 100 WBC
PLATELET # BLD AUTO: 331 K/UL (ref 150–400)
PMV BLD AUTO: 10.8 FL (ref 8.9–12.9)
POTASSIUM SERPL-SCNC: 3.4 MMOL/L (ref 3.5–5.1)
PROT SERPL-MCNC: 6.2 G/DL (ref 6.4–8.2)
RBC # BLD AUTO: 3.07 M/UL (ref 3.8–5.2)
RBC MORPH BLD: ABNORMAL
SODIUM SERPL-SCNC: 135 MMOL/L (ref 136–145)
WBC # BLD AUTO: 9.1 K/UL (ref 3.6–11)

## 2021-05-13 PROCEDURE — 85025 COMPLETE CBC W/AUTO DIFF WBC: CPT

## 2021-05-13 PROCEDURE — 74011250636 HC RX REV CODE- 250/636: Performed by: INTERNAL MEDICINE

## 2021-05-13 PROCEDURE — 80053 COMPREHEN METABOLIC PANEL: CPT

## 2021-05-13 PROCEDURE — 97165 OT EVAL LOW COMPLEX 30 MIN: CPT

## 2021-05-13 PROCEDURE — 99222 1ST HOSP IP/OBS MODERATE 55: CPT | Performed by: NURSE PRACTITIONER

## 2021-05-13 PROCEDURE — 74011250637 HC RX REV CODE- 250/637: Performed by: FAMILY MEDICINE

## 2021-05-13 PROCEDURE — 74011250636 HC RX REV CODE- 250/636: Performed by: FAMILY MEDICINE

## 2021-05-13 PROCEDURE — 97161 PT EVAL LOW COMPLEX 20 MIN: CPT

## 2021-05-13 PROCEDURE — P9047 ALBUMIN (HUMAN), 25%, 50ML: HCPCS | Performed by: INTERNAL MEDICINE

## 2021-05-13 PROCEDURE — 36415 COLL VENOUS BLD VENIPUNCTURE: CPT

## 2021-05-13 PROCEDURE — 65660000000 HC RM CCU STEPDOWN

## 2021-05-13 PROCEDURE — 74011250637 HC RX REV CODE- 250/637: Performed by: INTERNAL MEDICINE

## 2021-05-13 PROCEDURE — 99231 SBSQ HOSP IP/OBS SF/LOW 25: CPT | Performed by: THORACIC SURGERY (CARDIOTHORACIC VASCULAR SURGERY)

## 2021-05-13 PROCEDURE — 97116 GAIT TRAINING THERAPY: CPT

## 2021-05-13 PROCEDURE — 97535 SELF CARE MNGMENT TRAINING: CPT

## 2021-05-13 RX ORDER — PANTOPRAZOLE SODIUM 40 MG/1
40 TABLET, DELAYED RELEASE ORAL
Status: DISCONTINUED | OUTPATIENT
Start: 2021-05-13 | End: 2021-05-14 | Stop reason: HOSPADM

## 2021-05-13 RX ORDER — PANTOPRAZOLE SODIUM 40 MG/1
40 TABLET, DELAYED RELEASE ORAL
Status: DISCONTINUED | OUTPATIENT
Start: 2021-05-14 | End: 2021-05-13

## 2021-05-13 RX ADMIN — PANTOPRAZOLE SODIUM 40 MG: 40 TABLET, DELAYED RELEASE ORAL at 15:25

## 2021-05-13 RX ADMIN — HEPARIN SODIUM 5000 UNITS: 5000 INJECTION INTRAVENOUS; SUBCUTANEOUS at 11:49

## 2021-05-13 RX ADMIN — MEGESTROL ACETATE 40 MG: 40 TABLET ORAL at 10:14

## 2021-05-13 RX ADMIN — HEPARIN SODIUM 5000 UNITS: 5000 INJECTION INTRAVENOUS; SUBCUTANEOUS at 03:16

## 2021-05-13 RX ADMIN — ASPIRIN 81 MG: 81 TABLET, CHEWABLE ORAL at 10:13

## 2021-05-13 RX ADMIN — DEXAMETHASONE SODIUM PHOSPHATE 4 MG: 4 INJECTION, SOLUTION INTRAMUSCULAR; INTRAVENOUS at 14:09

## 2021-05-13 RX ADMIN — ALBUMIN (HUMAN) 25 G: 0.25 INJECTION, SOLUTION INTRAVENOUS at 00:31

## 2021-05-13 RX ADMIN — ALBUMIN (HUMAN) 25 G: 0.25 INJECTION, SOLUTION INTRAVENOUS at 06:38

## 2021-05-13 RX ADMIN — ALBUMIN (HUMAN) 25 G: 0.25 INJECTION, SOLUTION INTRAVENOUS at 11:48

## 2021-05-13 RX ADMIN — Medication 10 ML: at 06:38

## 2021-05-13 RX ADMIN — DEXAMETHASONE SODIUM PHOSPHATE 4 MG: 4 INJECTION, SOLUTION INTRAMUSCULAR; INTRAVENOUS at 21:12

## 2021-05-13 RX ADMIN — DEXAMETHASONE SODIUM PHOSPHATE 4 MG: 4 INJECTION, SOLUTION INTRAMUSCULAR; INTRAVENOUS at 03:16

## 2021-05-13 RX ADMIN — SODIUM CHLORIDE 75 ML/HR: 9 INJECTION, SOLUTION INTRAVENOUS at 10:14

## 2021-05-13 RX ADMIN — DEXAMETHASONE SODIUM PHOSPHATE 4 MG: 4 INJECTION, SOLUTION INTRAMUSCULAR; INTRAVENOUS at 10:13

## 2021-05-13 RX ADMIN — LEVETIRACETAM 500 MG: 500 TABLET ORAL at 10:13

## 2021-05-13 RX ADMIN — ALBUMIN (HUMAN) 25 G: 0.25 INJECTION, SOLUTION INTRAVENOUS at 17:11

## 2021-05-13 RX ADMIN — HEPARIN SODIUM 5000 UNITS: 5000 INJECTION INTRAVENOUS; SUBCUTANEOUS at 18:29

## 2021-05-13 RX ADMIN — LEVETIRACETAM 500 MG: 500 TABLET ORAL at 17:11

## 2021-05-13 NOTE — PROGRESS NOTES
Fact sheet given to Ms. Johnson and daughter and possible side effects reviewed with them. Attestation signed by Ms. Johnson and daughter. They wish to wait another day to read fact sheet and discuss.

## 2021-05-13 NOTE — PROGRESS NOTES
Problem: Self Care Deficits Care Plan (Adult)  Goal: *Therapy Goal (Edit Goal, Insert Text)  Description:   FUNCTIONAL STATUS PRIOR TO ADMISSION: Per patient report: Patient was independent with basic ADL tasks and ambulated without use of DME within the home. Patient has a rollator available if needed. HOME SUPPORT: The patient lived with spouse. Occupational Therapy Goals  Initiated 5/13/2021  1. Patient will perform grooming with modified independence within 7 day(s). 2.  Patient will perform bathing with modified independence within 7 day(s). 3.  Patient will perform upper body/lower body dressing with modified independence within 7 day(s). 4.  Patient will perform toilet transfers with modified independence within 7 day(s). 5.  Patient will perform all aspects of toileting with modified independence within 77 day(s). 6.  Patient will participate in upper extremity therapeutic exercise/activities with minimal assistance/contact guard assist for 10 minutes within 7 day(s). 7.  Patient will utilize energy conservation techniques during functional activities with verbal cues within 7 day(s). Outcome: Not Met  OCCUPATIONAL THERAPY EVALUATION  Patient: Moisés Tolbert (89 y.o. female)  Date: 5/13/2021  Primary Diagnosis: SOB (shortness of breath) [R06.02]        Precautions: fall       ASSESSMENT  Based on the objective data described below, the patient presents with mild confusion, some decreased insight into abilities with encouragement required, though overall requires supervision- CGA (for bathing in stand) for basic ADL tasks and related mobility. 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 Uncertain to extent that patient understands this new diagnosis.  Patient will benefit from OT services to instruct in pain reduction strategies during UB ADL, energy conservation, safety and reduced fall risk during ADL and related transfers. Home health OT recommended at discharge    Current Level of Function Impacting Discharge (ADLs/self-care):supervision- CGA (for bathing in stand) for basic ADL tasks and related mobility    Functional Outcome Measure: The patient scored Total: 75/100 on the Barthel Index outcome measure which is indicative of 25% impaired ability to care for basic self needs/dependency on others        Other factors to consider for discharge: Family assistance reportedly available      Patient will benefit from skilled therapy intervention to address the above noted impairments. PLAN :  Recommendations and Planned Interventions: self care training, functional mobility training, therapeutic exercise, balance training, therapeutic activities, cognitive retraining, endurance activities, patient education, home safety training, and family training/education    Frequency/Duration: Patient will be followed by occupational therapy 3 times a week to address goals. Recommendation for discharge: (in order for the patient to meet his/her long term goals)  Occupational therapy at least 2 days/week in the home     This discharge recommendation:  Has been made in collaboration with the attending provider and/or case management    IF patient discharges home will need the following DME: may benefit from shower chair        SUBJECTIVE:   Patient pleasant and cooperative    OBJECTIVE DATA SUMMARY:   HISTORY:   Past Medical History:   Diagnosis Date    Chronic obstructive pulmonary disease ([facility] [address]     Ill-defined condition     tumor in arm   History reviewed. No pertinent surgical history.     Expanded or extensive additional review of patient history:     Home Situation  Home Environment: Private residence  # Steps to Enter: 6  Rails to Enter: Yes  Hand Rails : Bilateral  One/Two Story Residence: One story  Living Alone: No  Support Systems: Spouse/Significant Other/Partner  Patient Expects to be Discharged to[de-identified] Private residence  Current DME Used/Available at Home: None  Tub or Shower Type: Tub/Shower combination    Hand dominance: Right    EXAMINATION OF PERFORMANCE DEFICITS:  Cognitive/Behavioral Status:  Neurologic State: Alert;Confused  Orientation Level: Oriented to person;Oriented to place;Oriented to time;Oriented to situation  Cognition: Follows commands        Hearing: Auditory  Auditory Impairment: None    Vision/Perceptual:                                Corrective Lenses: Glasses    Range of Motion:    AROM: Generally decreased, functional      Limited in LUE due to painful movement secondary to mass in upper arm                   Strength:    Strength: Generally decreased, functional      Limited in LUE due to painful movement secondary to mass in upper arm          Coordination:  Coordination: Within functional limits  Fine Motor Skills-Upper: Left Intact; Right Intact    Gross Motor Skills-Upper: Left Intact; Right Intact    Tone & Sensation:    Tone: Normal  Sensation: Intact                      Balance:  Sitting: Intact; Without support    Functional Mobility and Transfers for ADLs:  Bed Mobility:  Supine to Sit: Supervision  Scooting: Supervision    Transfers:  Sit to Stand: Supervision  Stand to Sit: Supervision  Bed to Chair: Supervision    ADL Assessment:  Feeding: Independent    Oral Facial Hygiene/Grooming: Supervision    Bathing: Contact guard assistance    Upper Body Dressing: Supervision    Lower Body Dressing: Supervision    Toileting: Supervision                ADL Intervention and task modifications:                    Functional Measure:  Barthel Index:    Bathin  Bladder: 10  Bowels: 10  Groomin  Dressing: 10  Feeding: 10  Mobility: 10  Stairs: 5  Toilet Use: 5  Transfer (Bed to Chair and Back): 10  Total: 75/100        The Barthel ADL Index: Guidelines  1.  The index should be used as a record of what a patient does, not as a record of what a patient could do. 2. The main aim is to establish degree of independence from any help, physical or verbal, however minor and for whatever reason. 3. The need for supervision renders the patient not independent. 4. A patient's performance should be established using the best available evidence. Asking the patient, friends/relatives and nurses are the usual sources, but direct observation and common sense are also important. However direct testing is not needed. 5. Usually the patient's performance over the preceding 24-48 hours is important, but occasionally longer periods will be relevant. 6. Middle categories imply that the patient supplies over 50 per cent of the effort. 7. Use of aids to be independent is allowed. Andrade Montero., Barthel, DStuartW. (9012). Functional evaluation: the Barthel Index. [address] [facility] (14)2. ALISE Sweeney, Rekha Leal., Nata Herrera., [place], [address] [place] [address] (1999). Measuring the change indisability after inpatient rehabilitation; comparison of the responsiveness of the Barthel Index and Functional [place] Measure. Journal of Neurology, Neurosurgery, and Psychiatry, 66(4), 511-691. Marixa Villalba, N.J.A, TAM Mitchell, & Dominga Marrero M.A. (2004.) Assessment of post-stroke quality of life in cost-effectiveness studies: The usefulness of the Barthel Index and the EuroQoL-5D.  Quality of Life Research, 15, 882-48         Occupational Therapy Evaluation Charge Determination   History Examination Decision-Making   MEDIUM Complexity : Expanded review of history including physical, cognitive and psychosocial  history  LOW Complexity : 1-3 performance deficits relating to physical, cognitive , or psychosocial skils that result in activity limitations and / or participation restrictions  LOW Complexity : No comorbidities that affect functional and no verbal or physical assistance needed to complete eval tasks       Based on the above components, the patient evaluation is determined to be of the following complexity level: LOW   Pain Rating:  Pain reported in upper left arm with shoulder flexion (mass noted). Pain relieved with rest     Activity Tolerance:   Fair    After treatment patient left in no apparent distress:    Sitting in chair, Call bell within reach, and Bed / chair alarm activated    COMMUNICATION/EDUCATION:   The patients plan of care was discussed with: Registered nurse. Home safety education was provided and the patient/caregiver indicated understanding. and Patient/family agree to work toward stated goals and plan of care. This patients plan of care is appropriate for delegation to AXEL.     Thank you for this referral.  Noemy Madrid OT  Time Calculation: 32 mins

## 2021-05-13 NOTE — PROGRESS NOTES
Clinical Update:    Visit Vitals  BP (!) 95/41   Pulse 66   Temp 98.4 °F (36.9 °C)   Resp 16   Ht 5' 2\" (1.575 m)   Wt 121 lb 14.6 oz (55.3 kg)   SpO2 96%   BMI 22.30 kg/m²         Noted Echo results:    Interpretation Summary    · Image quality for this study was suboptimal.  · LV: Estimated LVEF is 55 - 60%. Normal cavity size, wall thickness and systolic function (ejection fraction normal). · Despite report of chest ct. Mass in left atrium could not be visualized with certainty  · TV: Pulmonary hypertension not suggested by Doppler findings. · Consider SYD if clinically indicated      Echo Findings    Left Ventricle Normal cavity size, wall thickness and systolic function (ejection fraction normal). The estimated EF is 55 - 60%. Unable to assess diastolic function. Left Atrium Normal cavity size. Despite report of chest ct. Mass in left atrium could not be visualized with certainty   Right Ventricle Normal cavity size and global systolic function. Right Atrium Normal cavity size. Interatrial Septum Interatrial septum not well visualized   Aortic Valve Normal valve structure, no stenosis and no regurgitation. Mitral Valve Normal valve structure and no stenosis. Trace regurgitation. Tricuspid Valve Normal valve structure and no stenosis. Trace regurgitation. Pulmonary hypertension not suggested by Doppler findings. Pulmonic Valve Pulmonic valve not well visualized. Aorta Normal aortic root. Pulmonary Artery Pulmonary hypertension not suggested by Doppler findings. Pericardium No evidence of pericardial effusion. Discussed with Dr. Caleb Kidd. Cardiac Surgery not indicated at this time. Cardiac Surgery will sign off.    Reilly Fry NP     Patient seen by PA/NP and agree with above.    Findings/Conditions: intra-cardiac tumor  Plan of Care: palliative care     Risk of morbidity and mortality - high  Medical decision making - high complexity

## 2021-05-13 NOTE — PROGRESS NOTES
Problem: Mobility Impaired (Adult and Pediatric)  Goal: *Acute Goals and Plan of Care (Insert Text)  Description: FUNCTIONAL STATUS PRIOR TO ADMISSION: The patient was independent ambulating inside the home without an assistive device . She had two falls in the home just prior to admission. Activity has been limited to walking short distances inside the home x 1 month. HOME SUPPORT PRIOR TO ADMISSION: The patient lived with her spouse who assists with meals, SBA when up though patient will get up on her own. .  Supportive daughter lives close by. Physical Therapy Goals  Initiated 5/13/2021  1. Patient will transfer from bed to chair and chair to bed with supervision/set-up using the least restrictive device within 7 day(s). 2.  Patient will perform sit to stand with supervision/set-up within 7 day(s). 3.  Patient will ambulate with supervision/set-up for 100 feet with the least restrictive device within 7 day(s). 4.  Patient will ascend/descend 6 stairs with  handrail(s) with minimal assistance/contact guard assist within 7 day(s). Outcome: Not Met   PHYSICAL THERAPY EVALUATION  Patient: Jessica Viera (15 y.o. female)  Date: 5/13/2021  Primary Diagnosis: SOB (shortness of breath) [R06.02]        Precautions: fall       ASSESSMENT  Based on the objective data described below, the patient presents with generalized weakness, pain, fatigue, impaired stand balance, hypotension, confusion, decreased insight into deficits and need for assistance limiting functional mobility s/p admission with SOB, now with diagnosis of CA w/ mets (brain tumor, lung mass, left arm mass. Noted Palliative to meet with patient. Current Level of Function Impacting Discharge (mobility/balance):   Supine<->Sit: Supervision  Sit<->Stand:  Up to min assist  Ambulation: Rolling walker, CGA, 15 ft (fatigues with distance)    Functional Outcome Measure: The patient scored 75/100 on the Barthel outcome measure.     Other factors to consider for discharge: Supportive family, h/o falls in the home     Patient will benefit from skilled therapy intervention to address the above noted impairments. PLAN :  Recommendations and Planned Interventions: transfer training, gait training, therapeutic exercises, patient and family training/education and therapeutic activities      Frequency/Duration: Patient will be followed by physical therapy:  5 times a week to address goals. Recommendation for discharge: (in order for the patient to meet his/her long term goals)  Physical therapy at least 2 days/week in the home AND ensure assist and/or supervision for safety with mobility    This discharge recommendation:  Has not yet been discussed the attending provider and/or case management    IF patient discharges home will need the following DME: to be determined (TBD)         SUBJECTIVE:   Patient stated I can't believe how tired I get.     OBJECTIVE DATA SUMMARY:   HISTORY:    Past Medical History:   Diagnosis Date    Chronic obstructive pulmonary disease ([facility] [address]     Ill-defined condition     tumor in arm   History reviewed. No pertinent surgical history. Personal factors and/or comorbidities impacting plan of care: PMH/ HPI    Home Situation  Home Environment: Private residence  # Steps to Enter: 6  Rails to Enter: Yes  Hand Rails : Bilateral  One/Two Story Residence: One story  Living Alone: No  Support Systems: Spouse/Significant Other/Partner  Patient Expects to be Discharged to[de-identified] Private residence  Current DME Used/Available at Home: Stephany Brooks, amishator  Tub or Shower Type: Tub/Shower combination    EXAMINATION/PRESENTATION/DECISION MAKING:   Critical Behavior:  Neurologic State: Alert  Orientation Level: Oriented to person  Cognition: Follows commands(Decreased awareness of need for assistance)     Hearing:   Auditory  Auditory Impairment: None  Skin:  Areas exposed appear intact  Edema: LUE mass  Range Of Motion:  AROM: Generally decreased, functional                      Strength:    Strength: Generally decreased, functional                    Tone & Sensation:   Tone: Normal              Sensation: Intact               Coordination:  Coordination: Within functional limits  Vision:   Corrective Lenses: Glasses  Functional Mobility:  Bed Mobility:  Supine to Sit: Supervision  Sit to Supine: Supervision  Scooting: Supervision  Transfers:  Sit to Stand: Minimum assistance(progressing to SBA on subsequent attempts)  Stand to Sit: Contact guard assistance  Bed to Chair: Supervision   Instructed in transfer technique when using walker for ambulation. Cues provided for hand placement. Balance:   Sitting: Intact; Without support  Standing: Impaired; Without support  Standing - Static: Fair  Standing - Dynamic : Fair;Constant support  Ambulation/Gait Training:  Distance (ft): 15 Feet (ft)(x2)  Assistive Device: Walker, rolling;Gait belt  Ambulation - Level of Assistance: Contact guard assistance;Assist x1;Adaptive equipment  Gait Description (WDL): Exceptions to WDL  Gait Abnormalities: Decreased step clearance  Speed/Nicole: Slow   Instructed in walker technique. Functional Measure:  Barthel Index:    Bathin  Bladder: 10  Bowels: 10  Groomin  Dressing: 10  Feeding: 10  Mobility: 10  Stairs: 5  Toilet Use: 5  Transfer (Bed to Chair and Back): 10  Total: 75/100       The Barthel ADL Index: Guidelines  1. The index should be used as a record of what a patient does, not as a record of what a patient could do. 2. The main aim is to establish degree of independence from any help, physical or verbal, however minor and for whatever reason. 3. The need for supervision renders the patient not independent. 4. A patient's performance should be established using the best available evidence. Asking the patient, friends/relatives and nurses are the usual sources, but direct observation and common sense are also important.  However direct testing is not needed. 5. Usually the patient's performance over the preceding 24-48 hours is important, but occasionally longer periods will be relevant. 6. Middle categories imply that the patient supplies over 50 per cent of the effort. 7. Use of aids to be independent is allowed. Mami Vasquez., Barthel, D.W. (1223). Functional evaluation: the Barthel Index. [address] [facility] (14)2. [place] Fluker kendall [place], J.J.M.F, Ralph Snellen., Elaine Salinas., [place] Necessary, [address] [facility] (1999). Measuring the change indisability after inpatient rehabilitation; comparison of the responsiveness of the Barthel Index and Functional [place] Measure. Journal of Neurology, Neurosurgery, and Psychiatry, 66(4), 876-556. MARIA ELENA Beckwith, TAM Mitchell, & Dannielle Kern M.A. (2004.) Assessment of post-stroke quality of life in cost-effectiveness studies: The usefulness of the Barthel Index and the EuroQoL-5D. Quality of Life Research, 15, 918-75            Physical Therapy Evaluation Charge Determination   History Examination Presentation Decision-Making   MEDIUM  Complexity : 1-2 comorbidities / personal factors will impact the outcome/ POC  LOW Complexity : 1-2 Standardized tests and measures addressing body structure, function, activity limitation and / or participation in recreation  MEDIUM Complexity : Evolving with changing characteristics        Based on the above components, the patient evaluation is determined to be of the following complexity level: LOW     Pain Rating:  Left UE pain w/ movement/ ROM d/t mass    Activity Tolerance:   Fair and requires rest breaks    Vitals:    05/13/21 1317 05/13/21 1324 05/13/21 1335   BP: (!) 107/47 (!) 109/44 (!) 102/43   BP 1 Location: Right upper arm Right upper arm Right upper arm   BP Patient Position: Supine Sitting; At rest Sitting  Comment: after walking   Pulse: 67 69 79   Resp:      SpO2:          After treatment patient left in no apparent distress:   Supine in bed, Call bell within reach, Caregiver / family present and Side rails x 3    COMMUNICATION/EDUCATION:   The patients plan of care was discussed with: Registered nurse. Fall prevention education was provided and the patient/caregiver indicated understanding., Patient/family have participated as able in goal setting and plan of care. and Patient/family agree to work toward stated goals and plan of care.     Thank you for this referral.  Brian Andrea, PT   Time Calculation: 30 mins

## 2021-05-13 NOTE — PROGRESS NOTES
Orders received, chart reviewed and patient evaluated by physical therapy. Pending progression with skilled acute physical therapy, recommend:  Physical therapy at least 2 days/week in the home AND ensure assist and/or supervision for safety with functional mobility    Recommend with nursing OOB to chair 3x/day and walking daily with staff assist and walker. Thank you for completing as able in order to maintain patient strength, endurance and independence. Full evaluation to follow.

## 2021-05-13 NOTE — PROGRESS NOTES
Physician Progress Note      Ryan SALGADO #:                  [id number]  :                       1945  ADMIT DATE:       2021 12:06 PM  [address] [place] DATE:  RESPONDING  PROVIDER #:        Gurjit Phoenix MD          QUERY TEXT:    Pt admitted with Multiple Mass Lesions. Pt noted to have Vasogenic edema, subfalcine herniation per 21 attending MD PN. If clinically significant, please document in progress notes and discharge summary if you are evaluating/treating any of the following: The medical record reflects the following:  Risk Factors: 71 y/o female:  Lung mass/ colon mass/bone mets/ breast mass/ large LUE mass Found on CT. Presumed metastatic cancer per Hematology/Oncology    CT head shows mets. Clinical Indicators:  21 Attending MD PN: Brain metastasis  -Left frontal mass with Vasogenic edema on CT head  -Neurosurgery evaluated the patient, no recommendation for resection  -Continue Keppra for seizure prophylaxis, Decadron for edema  -Patient without any neuro deficit    21 Hem/Onc PN  Lung mass/ colon mass/bone mets/ breast mass/ large LUE mass  Found on CT. Presumed metastatic cancer. CT head shows mets. Needs brain MRI. Had biopsy today of unclear site. Await path. Biopsy may give us site of origin which will help direct therapy, along with molecular testing.    21 CT Head IMPRESSION:  Left frontal mass with surrounding vasogenic edema resulting in mass effect on  the left lateral ventricle and approximately 7 mm left to right subfalcine  herniation. No other discrete mass is demonstrated although foci of Vasogenic  edema suggest other areas of intracranial metastatic disease    21 US guided Biopsy Lymph Node IMPRESSION  Technically successful ultrasound-guided core biopsy of right  paraspinal intramuscular presumed metastatic mass lesion.     Treatment: Admit, hospitalist consult, neurosurgery consult, CT scan, Keppra, Decadron, neuro checks per Md order, Lymph node biopsy. Options provided:  -- Cerebral edema  -- Brain compression  -- Cerebral edema and Brain compression  -- Other - I will add my own diagnosis  -- Disagree - Not applicable / Not valid  -- Disagree - Clinically unable to determine / Unknown  -- Refer to Clinical Documentation Reviewer    PROVIDER RESPONSE TEXT:    This patient has cerebral edema and brain compression.     Query created by: Tera Barker on 5/13/2021 3:29 PM      Electronically signed by:  Keyla Ramirez MD 5/13/2021 5:23 PM

## 2021-05-13 NOTE — PROGRESS NOTES
Problem: Falls - Risk of  Goal: *Absence of Falls  Description: Document Tawana Aquino Fall Risk and appropriate interventions in the flowsheet. Outcome: Progressing Towards Goal  Note: Fall Risk Interventions:  Mobility Interventions: Patient to call before getting OOB              Elimination Interventions:  Toileting schedule/hourly rounds    History of Falls Interventions: Door open when patient unattended, Room close to nurse's station, Bed/chair exit alarm         Problem: Patient Education: Go to Patient Education Activity  Goal: Patient/Family Education  Outcome: Progressing Towards Goal     Problem: General Medical Care Plan  Goal: *Vital signs within specified parameters  Outcome: Progressing Towards Goal  Goal: *Labs within defined limits  Outcome: Progressing Towards Goal  Goal: *Absence of infection signs and symptoms  Outcome: Progressing Towards Goal  Goal: *Optimal pain control at patient's stated goal  Outcome: Progressing Towards Goal  Goal: *Skin integrity maintained  Outcome: Progressing Towards Goal  Goal: *Fluid volume balance  Outcome: Progressing Towards Goal  Goal: *Optimize nutritional status  Outcome: Progressing Towards Goal  Goal: *Anxiety reduced or absent  Outcome: Progressing Towards Goal  Goal: *Progressive mobility and function (eg: ADL's)  Outcome: Progressing Towards Goal     Problem: Patient Education: Go to Patient Education Activity  Goal: Patient/Family Education  Outcome: Progressing Towards Goal     Problem: Patient Education: Go to Patient Education Activity  Goal: Patient/Family Education  Outcome: Progressing Towards Goal     Problem: Patient Education: Go to Patient Education Activity  Goal: Patient/Family Education  Outcome: Progressing Towards Goal

## 2021-05-13 NOTE — CONSULTS
Palliative Medicine Consult  Fan: [phone] ([phone])    Patient Name: Victor Manuel Orozco  YOB: 1945    Date of Initial Consult: 5/13/21  Reason for Consult: Overwhelming symptoms  Requesting Provider: Dr. Rohit Dejesus  Primary Care Physician: Jacque Goldberg NP     SUMMARY:   Victor Manuel Orozco is a 70 y.o. female with a past history of COPD, who was admitted on 5/11/2021 from home with a diagnosis of suspected metastatic disease. She presented with complaints of cough, lump on left bicep, recent hemoptysis, and difficulty swallowing. Imaging revealed a right upper lobe mass and evidence of likely widespread metastatic disease. She was admitted for further management. She underwent a biopsy on 5/12/21, with results pending. Current medical issues leading to Palliative Medicine involvement include: new suspected metastatic cancer diagnosis, overwhelming symptoms. Social:  to , Tejal Mccarty. She has a daughter, Odella Kayser. PALLIATIVE DIAGNOSES:   1. Goals of care  2. Suspected metastatic cancer  3. Left arm mass  4. Intermittent confusion  5. Anxiety       PLAN:   1. Prior to seeing patient, the medical record was reviewed. 2. Patient seen at the bedside with her daughter Odella Kayser present. Palliative medicine services introduced and contact information given. 3. We talked about the new diagnosis of suspected metastatic cancer and the pending biopsy results. The patient and family are anxiously awaiting the results and the options for treatment. We talked about needing the biopsy results to develop the plan and then the oncology team will explain what her options are for treatment. We also talked about how difficult it is to be in this period of limbo not knowing the diagnosis or the plan. Psychosocial support offered to them.   4. Her current goals for her care are to be discharged home as soon as possible and to follow up with all of her providers outpatient to develop and implement a treatment plan for the suspected cancer. 5. She is feeling fairly well and denies pain or other symptoms at this time. I explained that the outpatient palliative medicine clinic may be a helpful service for her to help manage her symptoms as she is going through treatment. 6. I let them know that our team will continue to follow along for support and to help discuss care decisions. I encouraged them to reach out to me if I can be of help to them as well. 7. Initial consult note routed to primary continuity provider and/or primary health care team members  8. Communicated plan of care with: Palliative IDTAda [facility] Team     GOALS OF CARE / TREATMENT PREFERENCES:     GOALS OF CARE:  Patient/Health Care Proxy Stated Goals: Other (comment)(be discharged home, follow up with providers outpatient, start treatment)    TREATMENT PREFERENCES:   Code Status: Full Code    Advance Care Planning:  [] The [facility] Interdisciplinary Team has updated the ACP Navigator with [address] and Patient Capacity      Advance Care Planning 5/11/2021   Confirm Advance Directive None       Medical Interventions: Full interventions     Other Instructions: Other:    As far as possible, the palliative care team has discussed with patient / health care proxy about goals of care / treatment preferences for patient. HISTORY:     History obtained from: chart, patient    CHIEF COMPLAINT: Lump on left arm, cough    HPI/SUBJECTIVE:    The patient is:   [x] Verbal and participatory  [] Non-participatory due to: She reports that \"things keep popping up everywhere\" and refers to the lump on her left upper arm. She denies pain, nausea, or shortness of breath. She has had an intermittent cough and recently had hemoptysis.      Clinical Pain Assessment (nonverbal scale for severity on nonverbal patients):   Clinical Pain Assessment  Severity: 0          Duration: for how long has pt been experiencing pain (e.g., 2 days, 1 month, years)  Frequency: how often pain is an issue (e.g., several times per day, once every few days, constant)     FUNCTIONAL ASSESSMENT:     Palliative Performance Scale (PPS):  PPS: 60       PSYCHOSOCIAL/SPIRITUAL SCREENING:     Palliative IDT has assessed this patient for cultural preferences / practices and a referral made as appropriate to needs (Cultural Services, Patient Advocacy, Ethics, etc.)    Any spiritual / Bahai concerns:  [] Yes /  [x] No    Caregiver Burnout:  [] Yes /  [x] No /  [] No Caregiver Present      Anticipatory grief assessment:   [x] Normal  / [] Maladaptive       ESAS Anxiety:      ESAS Depression:          REVIEW OF SYSTEMS:     Positive and pertinent negative findings in ROS are noted above in HPI. The following systems were [x] reviewed / [] unable to be reviewed as noted in HPI  Other findings are noted below. Systems: constitutional, ears/nose/mouth/throat, respiratory, gastrointestinal, genitourinary, musculoskeletal, integumentary, neurologic, psychiatric, endocrine. Positive findings noted below. Modified ESAS Completed by: provider   Fatigue: 3 Drowsiness: 0     Pain: 0     Nausea: 0     Dyspnea: 0                    PHYSICAL EXAM:     From RN flowsheet:  Wt Readings from Last 3 Encounters:   05/13/21 121 lb 14.6 oz (55.3 kg)   04/13/21 127 lb (57.6 kg)     Blood pressure (!) 105/53, pulse 72, temperature 97.7 °F (36.5 °C), resp. rate 23, height 5' 2\" (1.575 m), weight 121 lb 14.6 oz (55.3 kg), SpO2 100 %.     Pain Scale 1: Numeric (0 - 10)  Pain Intensity 1: 0                 Last bowel movement, if known:     Constitutional: alert, awake, pleasant, no acute distress  Eyes: pupils equal, anicteric  ENMT: no nasal discharge, moist mucous membranes  Cardiovascular: regular rhythm, distal pulses intact  Respiratory: breathing not labored, symmetric  Gastrointestinal: soft non-tender  Musculoskeletal: no deformity, no tenderness to palpation  Skin: warm, dry  Neurologic: following commands, moving all extremities, intermittent confusion  Psychiatric: full affect, mildly anxious       HISTORY:     Active Problems:    SOB (shortness of breath) (5/11/2021)      Past Medical History:   Diagnosis Date    Chronic obstructive pulmonary disease ([address]     Ill-defined condition     tumor in arm      History reviewed. No pertinent surgical history. History reviewed. No pertinent family history. History reviewed, no pertinent family history.   Social History     Tobacco Use    Smoking status: Not on file   Substance Use Topics    Alcohol use: Not on file     No Known Allergies   Current Facility-Administered Medications   Medication Dose Route Frequency    pantoprazole (PROTONIX) tablet 40 mg  40 mg Oral ACB    albumin human 25% (BUMINATE) solution 25 g  25 g IntraVENous Q6H    dexamethasone (DECADRON) 4 mg/mL injection 4 mg  4 mg IntraVENous Q6H    levETIRAcetam (KEPPRA) tablet 500 mg  500 mg Oral BID    megestroL (MEGACE) tablet 40 mg  40 mg Oral DAILY    0.9% sodium chloride infusion  75 mL/hr IntraVENous CONTINUOUS    sodium chloride (NS) flush 5-40 mL  5-40 mL IntraVENous Q8H    sodium chloride (NS) flush 5-40 mL  5-40 mL IntraVENous PRN    acetaminophen (TYLENOL) tablet 650 mg  650 mg Oral Q4H PRN    heparin (porcine) injection 5,000 Units  5,000 Units SubCUTAneous Q8H    albuterol (PROVENTIL VENTOLIN) nebulizer solution 2.5 mg  2.5 mg Nebulization Q4H PRN    aspirin chewable tablet 81 mg  81 mg Oral DAILY          LAB AND IMAGING FINDINGS:     Lab Results   Component Value Date/Time    WBC 9.1 05/13/2021 03:30 AM    HGB 7.6 (L) 05/13/2021 03:30 AM    PLATELET 824 09/02/5071 03:30 AM     Lab Results   Component Value Date/Time    Sodium 135 (L) 05/13/2021 03:30 AM    Potassium 3.4 (L) 05/13/2021 03:30 AM    Chloride 102 05/13/2021 03:30 AM    CO2 26 05/13/2021 03:30 AM    BUN 12 05/13/2021 03:30 AM    Creatinine 0.55 05/13/2021 03:30 AM    Calcium 9.1 05/13/2021 03:30 AM    Magnesium 1.7 05/12/2021 03:50 AM      Lab Results   Component Value Date/Time    Alk. phosphatase 88 05/13/2021 03:30 AM    Protein, total 6.2 (L) 05/13/2021 03:30 AM    Albumin 3.5 05/13/2021 03:30 AM    Globulin 2.7 05/13/2021 03:30 AM     No results found for: INR, PTMR, PTP, PT1, PT2, APTT, INREXT, INREXT   Lab Results   Component Value Date/Time    Iron 12 (L) 05/12/2021 03:50 AM    TIBC 172 (L) 05/12/2021 03:50 AM    Iron % saturation 7 (L) 05/12/2021 03:50 AM    Ferritin 232 05/12/2021 03:50 AM      No results found for: PH, PCO2, PO2  No components found for: GLPOC   No results found for: CPK, CKMB             Total time: 50 min  Counseling / coordination time, spent as noted above: 30 min  > 50% counseling / coordination?: yes    Prolonged service was provided for  []30 min   []75 min in face to face time in the presence of the patient, spent as noted above. Time Start:   Time End:   Note: this can only be billed with 43836 (initial) or 87574 (follow up). If multiple start / stop times, list each separately.

## 2021-05-13 NOTE — PROGRESS NOTES
Transitions of Care: 12% risk of re-admission      -TBD at this time pending medical plan of care and progress, OT recommending Home Health services- multiple specialities consulted for malignancy       The CM spoke with OT- patient presenting with more confusion today, the patient's daughter is very supportive. The patient has numerous specialties consulted- CM continues to follow, monitoring for medical plan of care and goals discussions- may require home health services, Palliative consulted. If family wishes for comfort- monitor for hospice needs, TBD at this time. The CM will continue to follow.      BRENT Slaughter

## 2021-05-13 NOTE — PROGRESS NOTES
[facility] Adult  Hospitalist Group                                                                                          Hospitalist Progress Note  Karma Salas MD  Answering service: [phone] OR [phone] from in house phone              Progress Note    Patient: Finn Baum MRN: [id number]  SSN: [id number]    YOB: 1945  Age: 70 y.o. Sex: female      Admit Date: 5/11/2021    LOS: 2 days     Subjective:     Patient currently being evaluated for metastatic cancer, unknown primary. Patient states that she is feeling better. Appetite is improving. Denies any pain. Patient has been ambulating. Daughter present at bedside. Denies any headache, dizziness, chest pain, shortness of breath, abdominal pain, nausea or vomiting. Objective:     Vitals:    05/13/21 0330 05/13/21 0600 05/13/21 1007 05/13/21 1107   BP: (!) 100/51  (!) 95/41 (!) 99/36   Pulse: 69  66 (!) 57   Resp: 16  16 20   Temp: 97.9 °F (36.6 °C)  98.4 °F (36.9 °C) 98.2 °F (36.8 °C)   SpO2: 96%  96% 100%   Weight:  55.3 kg (121 lb 14.6 oz)     Height:            Intake and Output:  Current Shift: No intake/output data recorded. Last three shifts: 05/11 1901 - 05/13 0700  In: 1687.1 [P.O.:240; I.V.:1447.1]  Out: -     Physical Exam:   GENERAL: alert, cooperative, no distress, appears stated age  THROAT & NECK: normal and no erythema or exudates noted. LUNG: clear to auscultation bilaterally  HEART: regular rate and rhythm, S1, S2 normal, no murmur, click, rub or gallop  ABDOMEN: soft, non-tender. Bowel sounds normal. No masses,  no organomegaly  EXTREMITIES:  extremities normal, atraumatic, no cyanosis or edema  SKIN: no rash or abnormalities  NEUROLOGIC: AOx3. PSYCHIATRIC: non focal    Lab/Data Review: All lab results for the last 24 hours reviewed.      Recent Results (from the past 24 hour(s))   ECHO ADULT COMPLETE    Collection Time: 05/12/21  2:28 PM   Result Value Ref Range    IVSd 1.04 (A) 0.60 - 0.90 cm    LVIDd 4.05 3.90 - 5.30 cm    LVIDs 2.45 cm    LVOT d 1.95 cm    LVPWd 0.98 (A) 0.60 - 0.90 cm    Pulmonic Valve Systolic Peak Instantaneous Gradient 2.08 mmHg    Triscuspid Valve Regurgitation Peak Gradient 25.02 mmHg    TR Max Velocity 250.10 cm/s    Ao Root D 3.27 cm    LV Mass .4 67.0 - 162.0 g    LV Mass AL Index 86.0 43.0 - 95.0 g/m2   CEA    Collection Time: 05/12/21  5:05 PM   Result Value Ref Range    CEA 89.2 ng/mL   CANCER AG 19-9    Collection Time: 05/12/21  5:05 PM   Result Value Ref Range    Carbohydrate Antigen 19-9, (CA 19-9) 1,267 (H) 0 - 35 U/mL   CANCER ANTIGEN 125    Collection Time: 05/12/21  5:05 PM   Result Value Ref Range    CA-125 384 (H) 1.5 - 35.0 U/mL   CBC WITH AUTOMATED DIFF    Collection Time: 05/13/21  3:30 AM   Result Value Ref Range    WBC 9.1 3.6 - 11.0 K/uL    RBC 3.07 (L) 3.80 - 5.20 M/uL    HGB 7.6 (L) 11.5 - 16.0 g/dL    HCT 24.4 (L) 35.0 - 47.0 %    MCV 79.5 (L) 80.0 - 99.0 FL    MCH 24.8 (L) 26.0 - 34.0 PG    MCHC 31.1 30.0 - 36.5 g/dL    RDW 13.7 11.5 - 14.5 %    PLATELET 716 166 - 998 K/uL    MPV 10.8 8.9 - 12.9 FL    NRBC 0.0 0  WBC    ABSOLUTE NRBC 0.00 0.00 - 0.01 K/uL    NEUTROPHILS 87 (H) 32 - 75 %    LYMPHOCYTES 9 (L) 12 - 49 %    MONOCYTES 3 (L) 5 - 13 %    EOSINOPHILS 0 0 - 7 %    BASOPHILS 0 0 - 1 %    IMMATURE GRANULOCYTES 1 (H) 0.0 - 0.5 %    ABS. NEUTROPHILS 7.9 1.8 - 8.0 K/UL    ABS. LYMPHOCYTES 0.8 0.8 - 3.5 K/UL    ABS. MONOCYTES 0.3 0.0 - 1.0 K/UL    ABS. EOSINOPHILS 0.0 0.0 - 0.4 K/UL    ABS. BASOPHILS 0.0 0.0 - 0.1 K/UL    ABS. IMM.  GRANS. 0.1 (H) 0.00 - 0.04 K/UL    DF SMEAR SCANNED      RBC COMMENTS MICROCYTOSIS  1+        RBC COMMENTS HYPOCHROMIA  1+        RBC COMMENTS SCHISTOCYTES  1+        RBC COMMENTS ATYPICAL LYMPHOCYTES PRESENT     METABOLIC PANEL, COMPREHENSIVE    Collection Time: 05/13/21  3:30 AM   Result Value Ref Range    Sodium 135 (L) 136 - 145 mmol/L    Potassium 3.4 (L) 3.5 - 5.1 mmol/L    Chloride 102 97 - 108 mmol/L CO2 26 21 - 32 mmol/L    Anion gap 7 5 - 15 mmol/L    Glucose 157 (H) 65 - 100 mg/dL    BUN 12 6 - 20 MG/DL    Creatinine 0.55 0.55 - 1.02 MG/DL    BUN/Creatinine ratio 22 (H) 12 - 20      GFR est AA >60 >60 ml/min/1.73m2    GFR est non-AA >60 >60 ml/min/1.73m2    Calcium 9.1 8.5 - 10.1 MG/DL    Bilirubin, total 0.2 0.2 - 1.0 MG/DL    ALT (SGPT) 21 12 - 78 U/L    AST (SGOT) 10 (L) 15 - 37 U/L    Alk. phosphatase 88 45 - 117 U/L    Protein, total 6.2 (L) 6.4 - 8.2 g/dL    Albumin 3.5 3.5 - 5.0 g/dL    Globulin 2.7 2.0 - 4.0 g/dL    A-G Ratio 1.3 1.1 - 2.2          Imaging:    Echo Adult Complete    Result Date: 5/12/2021  · Image quality for this study was suboptimal. · LV: Estimated LVEF is 55 - 60%. Normal cavity size, wall thickness and systolic function (ejection fraction normal). · Despite report of chest ct. Mass in left atrium could not be visualized with certainty · TV: Pulmonary hypertension not suggested by Doppler findings.  · Consider SYD if clinically indicated         Assessment and Plan:     Multiple mass lesions  -Multiple mass lesion including brain, lung, Breast, colon, left upper extremity as well as back, on CT scans, presumed metastatic  -S/p ultrasound-guided biopsy of the right paraspinal lesion on 5/12, pathology is pending  -Oncology following, further plan per oncology    Brain metastasis  -Left frontal mass with vasogenic edema on CT head  -Neurosurgery evaluated the patient, no recommendation for resection  -Continue Keppra for seizure prophylaxis, Decadron for edema  -Patient without any neuro deficit    Possible left atrial mass  -Initially noted on CT but echo shows that mass in left atrium could not be visualized with certainty  -Cardiac surgery evaluated the patient and no surgery was indicated    Hyponatremia  -Improving, continue IV fluid with normal saline  -Monitor sodium level    Leukocytosis  -Likely due to steroid    Anemia  -Hemoglobin is trending down daily  -No clinical signs or symptoms of acute bleed, ? Hemodilution on IV fluid  -Monitor CBC    Anorexia  -Improving with Megace    Generalized weakness  -PT to evaluate    Discharge disposition: Likely in the next 24 to 48 hours pending medical improvement.     Signed By: Apolinar Murillo MD     May 13, 2021

## 2021-05-13 NOTE — PROGRESS NOTES
Bedside shift change report given to April RN (oncoming nurse) by Lakia Brandon RN (offgoing nurse). Report included the following information SBAR, Intake/Output, MAR, Med Rec Status and Cardiac Rhythm nsr.

## 2021-05-14 ENCOUNTER — APPOINTMENT (OUTPATIENT)
Dept: CT IMAGING | Age: 76
DRG: 829 | End: 2021-05-14
Attending: NURSE PRACTITIONER
Payer: MEDICARE

## 2021-05-14 VITALS
HEART RATE: 67 BPM | HEIGHT: 62 IN | SYSTOLIC BLOOD PRESSURE: 110 MMHG | DIASTOLIC BLOOD PRESSURE: 50 MMHG | WEIGHT: 121.25 LBS | RESPIRATION RATE: 18 BRPM | BODY MASS INDEX: 22.31 KG/M2 | TEMPERATURE: 97.5 F | OXYGEN SATURATION: 97 %

## 2021-05-14 LAB
ANION GAP SERPL CALC-SCNC: 5 MMOL/L (ref 5–15)
BASOPHILS # BLD: 0 K/UL (ref 0–0.1)
BASOPHILS NFR BLD: 0 % (ref 0–1)
BUN SERPL-MCNC: 15 MG/DL (ref 6–20)
BUN/CREAT SERPL: 33 (ref 12–20)
CALCIUM SERPL-MCNC: 9 MG/DL (ref 8.5–10.1)
CHLORIDE SERPL-SCNC: 108 MMOL/L (ref 97–108)
CO2 SERPL-SCNC: 24 MMOL/L (ref 21–32)
CREAT SERPL-MCNC: 0.46 MG/DL (ref 0.55–1.02)
DIFFERENTIAL METHOD BLD: ABNORMAL
EOSINOPHIL # BLD: 0 K/UL (ref 0–0.4)
EOSINOPHIL NFR BLD: 0 % (ref 0–7)
ERYTHROCYTE [DISTWIDTH] IN BLOOD BY AUTOMATED COUNT: 14.1 % (ref 11.5–14.5)
GLUCOSE SERPL-MCNC: 134 MG/DL (ref 65–100)
HCT VFR BLD AUTO: 25.3 % (ref 35–47)
HGB BLD-MCNC: 7.7 G/DL (ref 11.5–16)
IMM GRANULOCYTES # BLD AUTO: 0.2 K/UL (ref 0–0.04)
IMM GRANULOCYTES NFR BLD AUTO: 1 % (ref 0–0.5)
LYMPHOCYTES # BLD: 1.3 K/UL (ref 0.8–3.5)
LYMPHOCYTES NFR BLD: 8 % (ref 12–49)
MCH RBC QN AUTO: 24.4 PG (ref 26–34)
MCHC RBC AUTO-ENTMCNC: 30.4 G/DL (ref 30–36.5)
MCV RBC AUTO: 80.3 FL (ref 80–99)
MONOCYTES # BLD: 0.7 K/UL (ref 0–1)
MONOCYTES NFR BLD: 4 % (ref 5–13)
NEUTS SEG # BLD: 14.9 K/UL (ref 1.8–8)
NEUTS SEG NFR BLD: 87 % (ref 32–75)
NRBC # BLD: 0 K/UL (ref 0–0.01)
NRBC BLD-RTO: 0 PER 100 WBC
PLATELET # BLD AUTO: 417 K/UL (ref 150–400)
PMV BLD AUTO: 10.2 FL (ref 8.9–12.9)
POTASSIUM SERPL-SCNC: 3.2 MMOL/L (ref 3.5–5.1)
RBC # BLD AUTO: 3.15 M/UL (ref 3.8–5.2)
SODIUM SERPL-SCNC: 137 MMOL/L (ref 136–145)
WBC # BLD AUTO: 17 K/UL (ref 3.6–11)

## 2021-05-14 PROCEDURE — 74011250637 HC RX REV CODE- 250/637: Performed by: INTERNAL MEDICINE

## 2021-05-14 PROCEDURE — 91303 HC RX REV CODE- 250/636: CPT | Performed by: FAMILY MEDICINE

## 2021-05-14 PROCEDURE — 74011250637 HC RX REV CODE- 250/637: Performed by: FAMILY MEDICINE

## 2021-05-14 PROCEDURE — 80048 BASIC METABOLIC PNL TOTAL CA: CPT

## 2021-05-14 PROCEDURE — 74011250636 HC RX REV CODE- 250/636: Performed by: NURSE PRACTITIONER

## 2021-05-14 PROCEDURE — 85025 COMPLETE CBC W/AUTO DIFF WBC: CPT

## 2021-05-14 PROCEDURE — 74011250636 HC RX REV CODE- 250/636: Performed by: INTERNAL MEDICINE

## 2021-05-14 PROCEDURE — 94760 N-INVAS EAR/PLS OXIMETRY 1: CPT

## 2021-05-14 PROCEDURE — 74011250636 HC RX REV CODE- 250/636: Performed by: FAMILY MEDICINE

## 2021-05-14 PROCEDURE — 36415 COLL VENOUS BLD VENIPUNCTURE: CPT

## 2021-05-14 PROCEDURE — 99232 SBSQ HOSP IP/OBS MODERATE 35: CPT | Performed by: INTERNAL MEDICINE

## 2021-05-14 PROCEDURE — P9047 ALBUMIN (HUMAN), 25%, 50ML: HCPCS | Performed by: INTERNAL MEDICINE

## 2021-05-14 PROCEDURE — 74011000636 HC RX REV CODE- 636: Performed by: RADIOLOGY

## 2021-05-14 PROCEDURE — 70470 CT HEAD/BRAIN W/O & W/DYE: CPT

## 2021-05-14 RX ORDER — DEXAMETHASONE 4 MG/1
4 TABLET ORAL 3 TIMES DAILY
Status: DISCONTINUED | OUTPATIENT
Start: 2021-05-14 | End: 2021-05-14 | Stop reason: HOSPADM

## 2021-05-14 RX ORDER — MEGESTROL ACETATE 40 MG/1
40 TABLET ORAL DAILY
Qty: 30 TAB | Refills: 0 | Status: SHIPPED | OUTPATIENT
Start: 2021-05-15

## 2021-05-14 RX ORDER — GUAIFENESIN 100 MG/5ML
81 LIQUID (ML) ORAL DAILY
Qty: 30 TAB | Refills: 0 | Status: SHIPPED | OUTPATIENT
Start: 2021-05-15

## 2021-05-14 RX ORDER — DEXAMETHASONE 4 MG/1
TABLET ORAL
Qty: 12 TAB | Refills: 0 | Status: SHIPPED | OUTPATIENT
Start: 2021-05-14

## 2021-05-14 RX ORDER — LEVETIRACETAM 500 MG/1
500 TABLET ORAL 2 TIMES DAILY
Qty: 60 TAB | Refills: 0 | Status: SHIPPED | OUTPATIENT
Start: 2021-05-14

## 2021-05-14 RX ADMIN — ACETAMINOPHEN 650 MG: 325 TABLET ORAL at 09:55

## 2021-05-14 RX ADMIN — IOPAMIDOL 100 ML: 612 INJECTION, SOLUTION INTRAVENOUS at 15:00

## 2021-05-14 RX ADMIN — ASPIRIN 81 MG: 81 TABLET, CHEWABLE ORAL at 08:31

## 2021-05-14 RX ADMIN — HEPARIN SODIUM 5000 UNITS: 5000 INJECTION INTRAVENOUS; SUBCUTANEOUS at 03:54

## 2021-05-14 RX ADMIN — ALBUMIN (HUMAN) 25 G: 0.25 INJECTION, SOLUTION INTRAVENOUS at 01:41

## 2021-05-14 RX ADMIN — ALBUMIN (HUMAN) 25 G: 0.25 INJECTION, SOLUTION INTRAVENOUS at 12:43

## 2021-05-14 RX ADMIN — MEGESTROL ACETATE 40 MG: 40 TABLET ORAL at 08:31

## 2021-05-14 RX ADMIN — ACETAMINOPHEN 650 MG: 325 TABLET ORAL at 13:58

## 2021-05-14 RX ADMIN — DEXAMETHASONE SODIUM PHOSPHATE 4 MG: 4 INJECTION, SOLUTION INTRAMUSCULAR; INTRAVENOUS at 08:31

## 2021-05-14 RX ADMIN — JNJ-78436735 0.5 ML: 50000000000 SUSPENSION INTRAMUSCULAR at 09:22

## 2021-05-14 RX ADMIN — PANTOPRAZOLE SODIUM 40 MG: 40 TABLET, DELAYED RELEASE ORAL at 06:35

## 2021-05-14 RX ADMIN — ALBUMIN (HUMAN) 25 G: 0.25 INJECTION, SOLUTION INTRAVENOUS at 06:35

## 2021-05-14 RX ADMIN — LEVETIRACETAM 500 MG: 500 TABLET ORAL at 08:31

## 2021-05-14 RX ADMIN — DEXAMETHASONE SODIUM PHOSPHATE 4 MG: 4 INJECTION, SOLUTION INTRAMUSCULAR; INTRAVENOUS at 03:54

## 2021-05-14 RX ADMIN — DEXAMETHASONE 4 MG: 4 TABLET ORAL at 16:05

## 2021-05-14 RX ADMIN — HEPARIN SODIUM 5000 UNITS: 5000 INJECTION INTRAVENOUS; SUBCUTANEOUS at 12:42

## 2021-05-14 NOTE — DISCHARGE SUMMARY
Discharge Summary       PATIENT ID: Nate Stevens  MRN: [id number]   YOB: 1945    DATE OF ADMISSION: 5/11/2021 12:06 PM    DATE OF DISCHARGE: 05/14/2021   PRIMARY CARE PROVIDER: Roya Block NP     ATTENDING PHYSICIAN: Hazel Lucio  DISCHARGING PROVIDER: Milinda Denver, MD    To contact this individual call [phone] and ask the  to page. If unavailable ask to be transferred the Adult Hospitalist Department. CONSULTATIONS: IP CONSULT TO CARDIAC SURGERY  IP CONSULT TO NEUROSURGERY  IP CONSULT TO ONCOLOGY  IP CONSULT TO INTERVENTIONAL RADIOLOGY  IP CONSULT TO PALLIATIVE CARE - PROVIDER  IP CONSULT TO ORTHOPEDIC SURGERY    PROCEDURES/SURGERIES: * No surgery found *    ADMITTING DIAGNOSES & HOSPITAL COURSE:     HPI  Nate Stevens is a 70 y.o. female with PMH significant for COPD who noticed a lump on her left biceps. She followed up with her PCP for this who ordered an MRI of that area but could not have this done X 2 due to claustrophobia. Over the last several months, she has also noted increased fatigue, shortness of breath, weight loss of 10 lbs without attempting, anorexia, chest fullness, difficulty swallowing, hemoptysis. She was then admitted to the hospital and CT showed RUL lung mass, Breast mass, LUE mass, Colon tumor, bone metastasis and a tumor in her septum for which Cardiac Surgery was consulted. Heme/Onc was also consulted for help in care planning. Hospital Course  Patient presents with symptoms of fatigue shortness of breath, weight loss, anorexia, chest fullness as well as hemoptysis. Patient was admitted for further work-up. Multiple radiology studies including CT head, CT chest, x-rays and MRI of the left humerus. Patient found to have multiple mass lesions including brain, lung, breast, colon, left upper extremity as well as back. Lesions were presumed metastatic.   Patient underwent ultrasound-guided biopsy of the right paraspinal lesion on 5/12, pathology is pending at the time of discharge. Oncology evaluated the patient and patient to follow-up with oncology as outpatient to review the pathology results and further treatment plan. Patient was also evaluated by neurosurgery for brain mass lesion in the left frontal area with vasogenic edema. No recommendation for resection. Patient to continue Keppra as well as tapering dose of Decadron upon discharge to home. Once pathology of her back biopsy is back, patient can be considered for gamma knife surgery referral if it is appropriate. Also there was a question of possible left atrial mass on initial CT but echo shows no mass. Cardiac surgery evaluated the patient and recommended no further surgery. Patient's anorexia is also improved with starting on Megace. Physical therapy evaluated the patient and recommended home with home physical therapy. DISCHARGE DIAGNOSES / PLAN:      1. Multiple mass lesions, presumed metastasis  2. Hyponatremia  3. Anemia  4. Anorexia     ADDITIONAL CARE RECOMMENDATIONS:     Follow-up with oncology in 1 week. PENDING TEST RESULTS:   At the time of discharge the following test results are still pending: Pathology of the biopsy of the left paraspinal lesion. FOLLOW UP APPOINTMENTS:    Follow-up Information     Follow up With Specialties Details Why Contact Info     [address] Call on 5/16/2021 Home health skilled nursing and PT/OT services.  Call agency if you have not heard from them by 12:00 p.m. [address] [place] [phone], Vicki Ricardo NP Nurse Practitioner   [facility] [address]  [address] [place] [address], Malissa Cool DO Hematology and Oncology, Internal Medicine, Hematology, Oncology In 1 week  [address] [address] [facility]  [phone]               DIET: Cardiac Diet  Oral Nutritional Supplements: No Oral Supplement prescribed    ACTIVITY: PT/OT per Home Health    WOUND CARE: None    EQUIPMENT needed: None      DISCHARGE MEDICATIONS:  Current Discharge Medication List      START taking these medications    Details   aspirin 81 mg chewable tablet Take 1 Tab by mouth daily. Qty: 30 Tab, Refills: 0  Start date: 5/15/2021      dexAMETHasone (DECADRON) 4 mg tablet 4 mg PO q8h x 2 days then 2 mg PO q8h x 2 days then 2 mg PO q12h x 2 days then 2mg PO daily x 2 days then stop. Qty: 12 Tab, Refills: 0  Start date: 5/14/2021      levETIRAcetam (KEPPRA) 500 mg tablet Take 1 Tab by mouth two (2) times a day. Qty: 60 Tab, Refills: 0  Start date: 5/14/2021      megestroL (MEGACE) 40 mg tablet Take 1 Tab by mouth daily. Qty: 30 Tab, Refills: 0  Start date: 5/15/2021         CONTINUE these medications which have NOT CHANGED    Details   omeprazole (PRILOSEC) 10 mg capsule 20 mg two (2) times a day. zolpidem (Ambien) 10 mg tablet Take 12.5 mg by mouth nightly as needed for Sleep. atorvastatin (LIPITOR) 20 mg tablet Take 20 mg by mouth nightly. NOTIFY YOUR PHYSICIAN FOR ANY OF THE FOLLOWING:   Fever over 101 degrees for 24 hours. Chest pain, shortness of breath, fever, chills, nausea, vomiting, diarrhea, change in mentation, falling, weakness, bleeding. Severe pain or pain not relieved by medications. Or, any other signs or symptoms that you may have questions about. DISPOSITION:    Home With:   OT  PT  HH  RN       Long term SNF/Inpatient Rehab    Independent/assisted living    Hospice    Other:       PATIENT CONDITION AT DISCHARGE:     Functional status    Poor     Deconditioned     Independent      Cognition     Lucid     Forgetful     Dementia      Catheters/lines (plus indication)    Gallo     PICC     PEG     None      Code status     Full code     DNR      PHYSICAL EXAMINATION AT DISCHARGE:  General:          Alert, cooperative, no distress, appears stated age.      HEENT:           Atraumatic, anicteric sclerae, pink conjunctivae No oral ulcers, mucosa moist, throat clear, dentition fair  Neck:               Supple, symmetrical  Lungs:             Clear to auscultation bilaterally. No Wheezing or Rhonchi. No rales. Chest wall:      No tenderness  No Accessory muscle use. Heart:              Regular  rhythm,  No  murmur   No edema  Abdomen:        Soft, non-tender. Not distended. Bowel sounds normal  Extremities:     No cyanosis. No clubbing,                            Skin turgor normal, Capillary refill normal  Skin:                Not pale. Not Jaundiced  No rashes   Psych:             Not anxious or agitated. Neurologic:      Alert, moves all extremities, answers questions appropriately and responds to commands       CHRONIC MEDICAL DIAGNOSES:  Problem List as of 5/14/2021 Never Reviewed          Codes Class Noted - Resolved    Goals of care, counseling/discussion ICD-10-CM: Z71.89  ICD-9-CM: V65.49  Unknown - Present        Arm mass, left ICD-10-CM: R22.32  ICD-9-CM: 315. 2  Unknown - Present        Intermittent confusion ICD-10-CM: R41.0  ICD-9-CM: 298.9  Unknown - Present        Anxiety ICD-10-CM: F41.9  ICD-9-CM: 300.00  Unknown - Present        SOB (shortness of breath) ICD-10-CM: R06.02  ICD-9-CM: 786.05  5/11/2021 - Present              Greater than 60 minutes were spent with the patient on counseling and coordination of care    Signed:   Gilson Ferrari MD  5/14/2021  12:30 PM

## 2021-05-14 NOTE — PROGRESS NOTES
Transitions of Care: 13% risk of re-admission      -Patient will discharge home with family and home health skilled nursing, PT/OT    -Family to transport   -Oncology follow-up    The CM spoke with attending MD, anticipate discharge today- PT/OT recommending home health, new cancer diagnosis- MD is agreeable for home health RN, PT/OT services. The CM met with the patient and daughter at bedside- discussed home health services, family agreeable- Freedom of Choice provided and referral placed to [facility] [address]. The patient has very supportive family- lives with , daughter lives within a mile of patient. Family to transport home. Medicare pt has received and reviewed 2nd IM letter informing them of their right to appeal the discharge. Copy has been placed on pt bedside chart. 9:48 a.m.- [facility] accepted for home health services. The CM will follow for transitions of care needs. Transition of Care Plan:     The Plan for Transition of Care is related to the following treatment goals: home with home health     The Patient and/or patient representative, daughter, was provided with a choice of provider and agrees  with the discharge plan. Yes [x] No []    A Freedom of choice list was provided with basic dialogue that supports the patient's individualized plan of care/goals and shares the quality data associated with the providers.        Yes [x] No []

## 2021-05-14 NOTE — PROGRESS NOTES
Problem: Falls - Risk of  Goal: *Absence of Falls  Description: Document Jeffery Borges Fall Risk and appropriate interventions in the flowsheet.   Outcome: Progressing Towards Goal  Note: Fall Risk Interventions:  Mobility Interventions: Patient to call before getting OOB         Medication Interventions: Patient to call before getting OOB    Elimination Interventions: Call light in reach    History of Falls Interventions: Bed/chair exit alarm         Problem: Patient Education: Go to Patient Education Activity  Goal: Patient/Family Education  Outcome: Progressing Towards Goal     Problem: General Medical Care Plan  Goal: *Vital signs within specified parameters  Outcome: Progressing Towards Goal  Goal: *Labs within defined limits  Outcome: Progressing Towards Goal  Goal: *Absence of infection signs and symptoms  Outcome: Progressing Towards Goal  Goal: *Optimal pain control at patient's stated goal  Outcome: Progressing Towards Goal  Goal: *Skin integrity maintained  Outcome: Progressing Towards Goal  Goal: *Fluid volume balance  Outcome: Progressing Towards Goal  Goal: *Optimize nutritional status  Outcome: Progressing Towards Goal  Goal: *Anxiety reduced or absent  Outcome: Progressing Towards Goal  Goal: *Progressive mobility and function (eg: ADL's)  Outcome: Progressing Towards Goal     Problem: Patient Education: Go to Patient Education Activity  Goal: Patient/Family Education  Outcome: Progressing Towards Goal     Problem: Patient Education: Go to Patient Education Activity  Goal: Patient/Family Education  Outcome: Progressing Towards Goal     Problem: Patient Education: Go to Patient Education Activity  Goal: Patient/Family Education  Outcome: Progressing Towards Goal     Problem: Breathing Pattern - Ineffective  Goal: *Absence of hypoxia  Outcome: Progressing Towards Goal  Goal: *Use of effective breathing techniques  Outcome: Progressing Towards Goal  Goal: *PALLIATIVE CARE:  Alleviation of Dyspnea  Outcome: Progressing Towards Goal     Problem: Patient Education: Go to Patient Education Activity  Goal: Patient/Family Education  Outcome: Progressing Towards Goal

## 2021-05-14 NOTE — PROGRESS NOTES
Neurosurgery    Would like to get CT head with contrast prior to discharge today to have an accurate number on the brain mets. Pt is extremely claustrophobic and unable to get an MRI without anesthesia. Based on the head CT without contrast it appears there is only one. Pt to follow-up with oncology to review pathology reports and when a diagnosis is obtained, can refer for gamma knife radiosurgery to the brain lesions. Recommend at discharge decadron 4 mg PO q8h x 2 days then 2 mg PO q8h x 2 days then 2 mg PO q12h x 2 days then 2mg PO daily x 2 days then stop. Keppra 500 mg po q12h for 1 month. Discussed with Dr. Felix Corporal of oncology.      Femi Yancey NP

## 2021-05-14 NOTE — PROGRESS NOTES
Attempted to schedule hospital follow up PCP appointment. Received recording the office is closed on . Left voice mail message with call back information.   Creed Warriormine, Care Management Specialist.

## 2021-05-14 NOTE — PROGRESS NOTES
Bedside shift change report given to Cole (oncoming nurse) by Radha Chino (offgoing nurse). Report included the following information SBAR, Kardex, Procedure Summary, Intake/Output, MAR and Cardiac Rhythm NSR.

## 2021-05-14 NOTE — PROGRESS NOTES
Problem: Falls - Risk of  Goal: *Absence of Falls  Description: Document Amelia Bonds Fall Risk and appropriate interventions in the flowsheet.   5/14/2021 1617 by Kade Hayes  Outcome: Resolved/Met  5/14/2021 1617 by Kade Hayes  Outcome: Progressing Towards Goal  Note: Fall Risk Interventions:  Mobility Interventions: Patient to call before getting OOB         Medication Interventions: Patient to call before getting OOB    Elimination Interventions: Call light in reach    History of Falls Interventions: Bed/chair exit alarm         Problem: Patient Education: Go to Patient Education Activity  Goal: Patient/Family Education  5/14/2021 1617 by Kade Hayes  Outcome: Resolved/Met  5/14/2021 1617 by Kade Hayes  Outcome: Progressing Towards Goal     Problem: General Medical Care Plan  Goal: *Vital signs within specified parameters  5/14/2021 1617 by Kade Hayes  Outcome: Resolved/Met  5/14/2021 1617 by Kade Hayes  Outcome: Progressing Towards Goal  Goal: *Labs within defined limits  5/14/2021 1617 by Kade Hayes  Outcome: Resolved/Met  5/14/2021 1617 by Kade Hayes  Outcome: Progressing Towards Goal  Goal: *Absence of infection signs and symptoms  5/14/2021 1617 by Kade Hayes  Outcome: Resolved/Met  5/14/2021 1617 by Kade Hayes  Outcome: Progressing Towards Goal  Goal: *Optimal pain control at patient's stated goal  5/14/2021 1617 by Kade Hayes  Outcome: Resolved/Met  5/14/2021 1617 by Kade Hayes  Outcome: Progressing Towards Goal  Goal: *Skin integrity maintained  5/14/2021 1617 by Kade Hayes  Outcome: Resolved/Met  5/14/2021 1617 by Kade Hayes  Outcome: Progressing Towards Goal  Goal: *Fluid volume balance  5/14/2021 1617 by Kade Hayes  Outcome: Resolved/Met  5/14/2021 1617 by Kade Hayes  Outcome: Progressing Towards Goal  Goal: *Optimize nutritional status  5/14/2021 1617 by Kade Hayes  Outcome: Resolved/Met  5/14/2021 1617 by Kade Hayes  Outcome: Progressing Towards Goal  Goal: *Anxiety reduced or absent  5/14/2021 1617 by Bridger Stanley  Outcome: Resolved/Met  5/14/2021 1617 by Bridger Stanley  Outcome: Progressing Towards Goal  Goal: *Progressive mobility and function (eg: ADL's)  5/14/2021 1617 by Bridger Stanley  Outcome: Resolved/Met  5/14/2021 1617 by Bridger Stanley  Outcome: Progressing Towards Goal     Problem: Patient Education: Go to Patient Education Activity  Goal: Patient/Family Education  5/14/2021 1617 by Bridger Stanley  Outcome: Resolved/Met  5/14/2021 1617 by Bridger Stanley  Outcome: Progressing Towards Goal     Problem: Patient Education: Go to Patient Education Activity  Goal: Patient/Family Education  5/14/2021 1551 by Bridger Stanley  Outcome: Resolved/Met  5/14/2021 1617 by Bridger Stanley  Outcome: Progressing Towards Goal     Problem: Patient Education: Go to Patient Education Activity  Goal: Patient/Family Education  5/14/2021 7296 by Bridger Stanley  Outcome: Resolved/Met  5/14/2021 1617 by Bridger Stanley  Outcome: Progressing Towards Goal     Problem: Breathing Pattern - Ineffective  Goal: *Absence of hypoxia  5/14/2021 1617 by Bridger Stanley  Outcome: Resolved/Met  5/14/2021 1617 by Bridger Stanley  Outcome: Progressing Towards Goal  Goal: *Use of effective breathing techniques  5/14/2021 1617 by Bridger Stanley  Outcome: Resolved/Met  5/14/2021 1617 by Bridger Stanley  Outcome: Progressing Towards Goal  Goal: *PALLIATIVE CARE:  Alleviation of Dyspnea  5/14/2021 1617 by Bridger Stanley  Outcome: Resolved/Met  5/14/2021 1617 by Bridger Stanley  Outcome: Progressing Towards Goal     Problem: Patient Education: Go to Patient Education Activity  Goal: Patient/Family Education  5/14/2021 9929 by Bridger Stanley  Outcome: Resolved/Met  5/14/2021 1617 by Bridger Stanley  Outcome: Progressing Towards Goal

## 2021-05-14 NOTE — PROGRESS NOTES
[facility] at [address], [address]  [address]: [phone]  F: [phone]    Reason for Visit:   Yesica Donald is a 70 y.o. female who is seen in hospital fu  for evaluation of abnormal CTs. Treatment History:   None from us yet    History of Present Illness:     Pt seen today in ER for abnormal CTs with likely metastatic cancer. No known cancer dx. No tissue dx of cancer. Pt being admitted for zhang. Pt is in bed getting ECHO at my visit. Daughter at bedside   Pt comfortable. C/o LUE pain. No fevers/ chills/ chest pain/ SOB/ nausea/ vomiting/diarrhea/    INTERIM HX:  Pt seen today for hospital fu of likely metastatic cancer. Path still pending. Pt doing ok overall. Wants to go home. 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 Fine with outpt fu for path. No new issues today. Past Medical History:   Diagnosis Date    Chronic obstructive pulmonary disease ([facility] [address]     Ill-defined condition     tumor in arm      History reviewed. No pertinent surgical history. Social History     Tobacco Use    Smoking status: Not on file   Substance Use Topics    Alcohol use: Not on file      History reviewed. No pertinent family history.   Current Facility-Administered Medications   Medication Dose Route Frequency    dexAMETHasone (DECADRON) tablet 4 mg  4 mg Oral TID    pantoprazole (PROTONIX) tablet 40 mg  40 mg Oral ACB    albumin human 25% (BUMINATE) solution 25 g  25 g IntraVENous Q6H    levETIRAcetam (KEPPRA) tablet 500 mg  500 mg Oral BID    megestroL (MEGACE) tablet 40 mg  40 mg Oral DAILY    0.9% sodium chloride infusion  75 mL/hr IntraVENous CONTINUOUS    sodium chloride (NS) flush 5-40 mL  5-40 mL IntraVENous Q8H    sodium chloride (NS) flush 5-40 mL  5-40 mL IntraVENous PRN    acetaminophen (TYLENOL) tablet 650 mg  650 mg Oral Q4H PRN    heparin (porcine) injection 5,000 Units  5,000 Units SubCUTAneous Q8H    albuterol (PROVENTIL VENTOLIN) nebulizer solution 2.5 mg  2.5 mg Nebulization Q4H PRN    aspirin chewable tablet 81 mg  81 mg Oral DAILY      No Known Allergies     Review of Systems: A complete review of systems was obtained, negative except as described above. Physical Exam:     Visit Vitals  BP (!) 110/50   Pulse 67   Temp 97.5 °F (36.4 °C)   Resp 18   Ht 5' 2\" (1.575 m)   Wt 121 lb 4.1 oz (55 kg)   SpO2 97%   BMI 22.18 kg/m²     ECOG PS: 1  General: No distress  Eyes: PERRLA, anicteric sclerae  HENT: Atraumatic  Neck: Supple  Respiratory:  normal respiratory effort  MS: in bed moving extremities  Skin: No rashes, ecchymoses, or petechiae. Normal temperature, turgor, and texture. Psych: Alert, oriented, appropriate affect, normal judgment/insight  Mass on LUE extremity  Last visit: Breast no masses on LEFT, right mass outer mid quadrant, mobile about 1.5 cm    Results:     Lab Results   Component Value Date/Time    WBC 17.0 (H) 05/14/2021 03:57 AM    HGB 7.7 (L) 05/14/2021 03:57 AM    HCT 25.3 (L) 05/14/2021 03:57 AM    PLATELET 332 (H) 71/27/5260 03:57 AM    MCV 80.3 05/14/2021 03:57 AM    ABS. NEUTROPHILS 14.9 (H) 05/14/2021 03:57 AM     Lab Results   Component Value Date/Time    Sodium 137 05/14/2021 03:57 AM    Potassium 3.2 (L) 05/14/2021 03:57 AM    Chloride 108 05/14/2021 03:57 AM    CO2 24 05/14/2021 03:57 AM    Glucose 134 (H) 05/14/2021 03:57 AM    BUN 15 05/14/2021 03:57 AM    Creatinine 0.46 (L) 05/14/2021 03:57 AM    GFR est AA >60 05/14/2021 03:57 AM    GFR est non-AA >60 05/14/2021 03:57 AM    Calcium 9.0 05/14/2021 03:57 AM     Lab Results   Component Value Date/Time    Bilirubin, total 0.2 05/13/2021 03:30 AM    ALT (SGPT) 21 05/13/2021 03:30 AM    Alk.  phosphatase 88 05/13/2021 03:30 AM    Protein, total 6.2 (L) 05/13/2021 03:30 AM    Albumin 3.5 05/13/2021 03:30 AM    Globulin 2.7 05/13/2021 03:30 AM       CT Results (most recent):  Results from Hospital Encounter encounter on 05/11/21   CT HEAD WO CONT    Narrative EXAMINATION:  CT HEAD WO CONT    CLINICAL INFORMATION:  metastatic disease  COMPARISON:  None. TECHNIQUE: Routine axial head CT was performed. IV contrast was not  administered. Sagittal and coronal reconstructions were generated. CT dose reduction was achieved through use of a standardized protocol tailored  for this examination and automatic exposure control for dose modulation. FINDINGS:    VENTRICULAR SYSTEM:  Normal for age. BASAL CISTERNS:  Patent. BRAIN PARENCHYMA:  Left frontal vasogenic edema associated with an anterior  frontal mass which is approximately 2.2 x 1.9 x 2.2 cm in size possible adjacent  left frontal mass, not clearly delineated. No other discrete mass is seen on  noncontrast imaging although right occipital and right frontal areas of mild  vasogenic edema may be due to adjacent metastatic disease. No intracranial  hemorrhage or acute infarct. MIDLINE SHIFT:  7 mm left-to-right subfalcine herniation. CALVARIUM/ SKULL BASE: No destructive lesion. Probable venous lake in the right  frontoparietal calvarium. PARANASAL SINUSES AND MASTOID AIR CELLS: Clear. VISUALIZED ORBITS: No significant abnormalities. SELLA: No enlargement. Impression Left frontal mass with surrounding vasogenic edema resulting in mass effect on  the left lateral ventricle and approximately 7 mm left to right subfalcine  herniation. No other discrete mass is demonstrated although foci of vasogenic  edema suggest other areas of intracranial metastatic disease. MRI is the optimal examination for detection of intracranial metastatic disease. Records reviewed and summarized above. Pathology report(s) reviewed above. Radiology report(s) reviewed above. Assessment/PLAN:     1)  Lung mass/ colon mass/bone mets/ breast mass/ large LUE mass  Found on CT. Presumed metastatic cancer. CT head shows mets. Needs brain MRI. Had biopsy today of unclear site. Await path. Biopsy may give us site of origin which will help direct therapy, along with molecular testing. Pt does have a RIGHT breast mass on exam and can do mammo/ ultrasound/ possible biopsy if needed. Pt clinically better today. Reviewed all with pt and daughter at bedside. We will follow for path if pt is d/c'd. Fu as outpt. .     2) brain mets on CT. Needs brain MRI but cannot do due to clostrophobia. Will do contrast CT. On decadron/ keppra. 3) breast mass on exam. Can do mammo/ ultrasound biopsy if needed for tissue. 4) anemia. Drop from 10 to 8. Monitor. Check iron levels since has colon mass. 5) colon mass. Can do colo if path needed. 6) psychosocial.  Mood good. Daughter at bedside and supportive. Reviewed all with her today. We will follow  Call if questions    I appreciate the opportunity to participate in Ms. [place].     Signed By: Titus [place] DO

## 2021-05-16 LAB
BACTERIA SPEC CULT: NORMAL
SERVICE CMNT-IMP: NORMAL

## 2021-05-17 ENCOUNTER — TELEPHONE (OUTPATIENT)
Dept: ONCOLOGY | Age: 76
End: 2021-05-17

## 2021-05-19 ENCOUNTER — HOSPITAL ENCOUNTER (EMERGENCY)
Age: 76
Discharge: HOME | End: 2021-05-19
Attending: EMERGENCY MEDICINE
Payer: MEDICARE

## 2021-05-19 ENCOUNTER — TELEPHONE (OUTPATIENT)
Dept: ONCOLOGY | Age: 76
End: 2021-05-19

## 2021-05-19 VITALS
TEMPERATURE: 98.2 F | SYSTOLIC BLOOD PRESSURE: 118 MMHG | OXYGEN SATURATION: 95 % | HEART RATE: 72 BPM | DIASTOLIC BLOOD PRESSURE: 66 MMHG | RESPIRATION RATE: 18 BRPM

## 2021-05-19 DIAGNOSIS — Z53.21 PATIENT LEFT WITHOUT BEING SEEN: Primary | ICD-10-CM

## 2021-05-19 PROCEDURE — 75810000275 HC EMERGENCY DEPT VISIT NO LEVEL OF CARE

## 2021-05-19 NOTE — TELEPHONE ENCOUNTER
Returned call to patient's daughter, ID verified X 2. Pharmacy is [facility]. States patient has been having increasing left arm pain due to large tumor, as well as increasing back pain. Seen by MD while inpatient at [facility], has Follow up appt on 5/24/21. Has been taking Tylenol ES for pain, but pain has increased over last 24 hours. States back pain related to positioning for comfort of left arm, hx of using TENS unit and Salonpas for back pain. Nurse advised alternating Tylenol with Advil taken with food for better pain control. Daughter states patient has \"HX of stomach ulcers. \"  Patient is unable to tolerate any pressure on left arm, unable to use heat or ice packs. Would like oral meds, \"not anything to knock her out. \"  Understands that as patient has not been seen in office yet, may be deferred to PCP, that message will be routed to Provider, RN to return call. Update to Provider.

## 2021-05-19 NOTE — ED TRIAGE NOTES
Pt referred by PCP for pain control of LEFT arm pain. Pt currently being followed by Dr. Vanessa Cerna for tumor on LEFT arm.

## 2021-05-19 NOTE — TELEPHONE ENCOUNTER
Patient's daughter called and stated that the patient has an appointment on Monday, but is experiencing quite a bit of pain today. Patient's daughter stated that she has been giving her Tylenol bur does not seem to be helping. Patient's daughter would like to know if there is anything else she can so or that Dr. Alva Melgar recommends that will help.     Call back [phone]

## 2021-05-20 DIAGNOSIS — G89.3 CANCER RELATED PAIN: Primary | ICD-10-CM

## 2021-05-20 RX ORDER — OXYCODONE HYDROCHLORIDE 5 MG/1
5 TABLET ORAL
Qty: 60 TABLET | Refills: 0 | Status: SHIPPED | OUTPATIENT
Start: 2021-05-20 | End: 2021-06-19

## 2021-05-20 RX ORDER — TRAMADOL HYDROCHLORIDE 50 MG/1
50-100 TABLET ORAL
Qty: 60 TABLET | Refills: 0 | Status: CANCELLED | OUTPATIENT
Start: 2021-05-20 | End: 2021-06-19

## 2021-05-20 NOTE — TELEPHONE ENCOUNTER
ALEX Willard. Otis Romero, patients daughter, to advise her that the Roxicodone has been sent to patients pharmacy per daughters info. She was very appreciative and happy!   Lorri Gregory

## 2021-05-20 NOTE — TELEPHONE ENCOUNTER
ALEX Rosen. Called patients daughter, Marcella Payne, to check on patient to see how she is doing. Brenda Chang stated Tana Michael is doing horrible! She went to the ER and we were there for about 4hours and they did not do anything to help her out. \" Daughter is very frustrated about this, stated \"I cannot just leave her like that but after 4 hours of them doing nothing we were discharged. \" Rodolfo Patel we are planning to send in a pain med for PRN use only. She stated she would like the prescription sent to the:    [facility] at  [address] [place] [address], [address] [place] [address] [address]    Daughter stated \"We have an appt with  coming up and I am guessing that's the earliest we can get her in so we will see her then. \" She was very appreciative for helping! Told her to give us a call if any questions or concerns come up!   Alessandro Gray

## 2021-05-20 NOTE — ED NOTES
Pt left without being seen. I was inadvertently assigned to this patient's treatment team.  I did not see this patient nor did I have any contact with this patient. I had no involvement during the evaluation, treatment or disposition of this patient. I am signing off this note to indicate only why my name appeared in the record.   Yina Segal MD

## 2021-05-24 ENCOUNTER — TELEPHONE (OUTPATIENT)
Dept: PALLATIVE CARE | Age: 76
End: 2021-05-24

## 2021-05-24 ENCOUNTER — DOCUMENTATION ONLY (OUTPATIENT)
Dept: ONCOLOGY | Age: 76
End: 2021-05-24

## 2021-05-24 ENCOUNTER — OFFICE VISIT (OUTPATIENT)
Dept: ONCOLOGY | Age: 76
End: 2021-05-24
Payer: MEDICARE

## 2021-05-24 ENCOUNTER — APPOINTMENT (OUTPATIENT)
Dept: INFUSION THERAPY | Age: 76
End: 2021-05-24

## 2021-05-24 VITALS
TEMPERATURE: 97.7 F | WEIGHT: 113.2 LBS | OXYGEN SATURATION: 99 % | SYSTOLIC BLOOD PRESSURE: 113 MMHG | HEART RATE: 77 BPM | BODY MASS INDEX: 20.7 KG/M2 | DIASTOLIC BLOOD PRESSURE: 73 MMHG

## 2021-05-24 DIAGNOSIS — G89.3 CANCER RELATED PAIN: ICD-10-CM

## 2021-05-24 DIAGNOSIS — C79.9 METASTATIC MALIGNANT NEOPLASM, UNSPECIFIED SITE (HCC): Primary | ICD-10-CM

## 2021-05-24 DIAGNOSIS — C80.1 ADENOSQUAMOUS CARCINOMA (HCC): Primary | ICD-10-CM

## 2021-05-24 DIAGNOSIS — C79.9 METASTATIC ADENOCARCINOMA (HCC): Primary | ICD-10-CM

## 2021-05-24 PROCEDURE — 1111F DSCHRG MED/CURRENT MED MERGE: CPT | Performed by: INTERNAL MEDICINE

## 2021-05-24 PROCEDURE — G8420 CALC BMI NORM PARAMETERS: HCPCS | Performed by: INTERNAL MEDICINE

## 2021-05-24 PROCEDURE — G8536 NO DOC ELDER MAL SCRN: HCPCS | Performed by: INTERNAL MEDICINE

## 2021-05-24 PROCEDURE — 99215 OFFICE O/P EST HI 40 MIN: CPT | Performed by: INTERNAL MEDICINE

## 2021-05-24 PROCEDURE — G0463 HOSPITAL OUTPT CLINIC VISIT: HCPCS | Performed by: INTERNAL MEDICINE

## 2021-05-24 PROCEDURE — G8432 DEP SCR NOT DOC, RNG: HCPCS | Performed by: INTERNAL MEDICINE

## 2021-05-24 PROCEDURE — 3017F COLORECTAL CA SCREEN DOC REV: CPT | Performed by: INTERNAL MEDICINE

## 2021-05-24 PROCEDURE — G8400 PT W/DXA NO RESULTS DOC: HCPCS | Performed by: INTERNAL MEDICINE

## 2021-05-24 PROCEDURE — 1090F PRES/ABSN URINE INCON ASSESS: CPT | Performed by: INTERNAL MEDICINE

## 2021-05-24 PROCEDURE — G8427 DOCREV CUR MEDS BY ELIG CLIN: HCPCS | Performed by: INTERNAL MEDICINE

## 2021-05-24 PROCEDURE — 1101F PT FALLS ASSESS-DOCD LE1/YR: CPT | Performed by: INTERNAL MEDICINE

## 2021-05-24 NOTE — LETTER
5/27/2021 Patient: Qian Ferrer YOB: 1945 Date of Visit: 5/24/2021 Ford Talley NP 
[address] [place] [address] [address] [place] [id number] [address] Fax: [phone] Dear Ford Talley NP, Thank you for referring Ms. Qian Ferrer to [address] [place] [address] for evaluation. My notes for this consultation are attached. If you have questions, please do not hesitate to call me. I look forward to following your patient along with you. Sincerely, Lizzette Cooper, DO

## 2021-05-24 NOTE — TELEPHONE ENCOUNTER
[facility] Palliative Medicine Office  Nursing Note  [phone]  Fax [phone]      Name:  Lizy Hernandez  YOB: 1945    Received outpatient Palliative Medicine referral from Dr. Daniela Shoemaker to see pt for symptom management and supportive care. Chart  reviewed. 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 Patient was hospitalized at [facility] 5/11/21-5/14/21 with SOB, increased fatigue, weight loss of 10 lbs without attempting, anorexia, chest fullness. Pt recently noticed a lump on her left bicep, her PCP ordered an MRI but pt could not have the MRI due to claustrophobia. Pt was admitted to the hospital and CT showed RUL lung mass, Breast mass, LUE mass, Colon tumor, bone metastasis and a tumor in her septum for which Cardiac Surgery was consulted. Pt was seen by inpatient Palliative team during her hospitalization. Pt presented to [facility] ED on 5/19/21 with left arm pain but left before seeing ED physician. This nurse called pt to schedule outpatient Palliative appt. Spoke with pt's daughter Kel Baker who states that pt saw Dr. Hernesto Gibbons this morning. Pt has decided to receive follow up at [facility] in [place] which is closer to her home. Dtr reports that pt has adequate pain medication at the present time and she does not need a Palliative appt.     Leonor Adkins, NATHANIELN, RN-BC, [facility]

## 2021-05-24 NOTE — PROGRESS NOTES
Ghazal Goel is a 70 y.o. female  Chief Complaint   Patient presents with   [facility] Follow Up     Abnormal CT scans     1. Have you been to the ER, urgent care clinic since your last visit? Hospitalized since your last visit? Yes, patient was in the ER for feeling fatigue and short of breath. Patient was also there for her arm pain. 2. Have you seen or consulted any other health care providers outside of the [address] [place] [address] since your last visit? Include any pap smears or colon screening. No.     Patient is having a lot of pain in her arm, very uncomfortable.

## 2021-05-24 NOTE — PROGRESS NOTES
Pt needs fu appt with palliative care  Saw them in hospital and went to ER for pain  Can we get them in to palliative care?   Also needs to see rad/onc    Add case to cancer conference

## 2021-05-24 NOTE — PROGRESS NOTES
[facility] [place] [facility]  [address], [place]: [phone]  F: [phone]    Reason for Visit:   Va Musa is a 70 y.o. female who is seen in hospital fu for new metastatic cancer. 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   Seen initially in hospital.     CT head from hospital shows mets:  Again noted is vasogenic edema in the left frontal lobe with 7 mm left to right  shift and mass effect on the frontal horn left lateral.. There is a 2.1 x 1.4 cm  peripherally enhancing mass in the left frontal lobe. Lateral to this mass in  the left frontal lobe, there is a 2.1 x 1.1 cm mass left frontal lobe based  against the dura. There is a 1 x 0.5 cm cystic mass right occipital lobe with a small amount of  vasogenic edema. The bone windows demonstrate no abnormalities. The visualized portions of the  paranasal sinuses and mastoid air cells are clear. IMPRESSION  1. There are 2 masses in the left frontal region adjacent to each other with  vasogenic edema and 7 mm subfalcine left to right shift. There is also a 1 x 0.5 cm cystic mass in the right occipital lobe. Findings are  consistent with metastatic disease. Pt is here today to discuss new cancer dx and treatment options. No fevers/ chills/ chest pain/ SOB/ nausea/ vomiting/diarrhea/  Pt biggest issue is pain in LEFT arm at mass. Has a colon mass, breast mass and lung mass. Had colo 2 years ago. Dr Karrie Yeung. Has poor appetite. Pt lives near [facility] and wants to get care there. Here with her daughter today. Last visit: Pt seen today in ER for abnormal CTs with likely metastatic cancer. No known cancer dx. No tissue dx of cancer. Pt being admitted for zhang. Pt is in bed getting ECHO at my visit.    Daughter at bedside   Pt comfortable. C/o LUE pain. No fevers/ chills/ chest pain/ SOB/ nausea/ vomiting/diarrhea/    INTERIM HX:  Pt seen today for hospital fu of likely metastatic cancer. Path still pending. Pt doing ok overall. Wants to go home. 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 Fine with outpt fu for path. No new issues today. Past Medical History:   Diagnosis Date    Chronic obstructive pulmonary disease ([address]     Ill-defined condition     tumor in arm      History reviewed. No pertinent surgical history. Social History     Tobacco Use    Smoking status: Never Smoker    Smokeless tobacco: Never Used   Substance Use Topics    Alcohol use: Not on file      History reviewed. No pertinent family history. Current Outpatient Medications   Medication Sig    oxyCODONE IR (ROXICODONE) 5 mg immediate release tablet Take 1 Tablet by mouth every four (4) hours as needed for Pain for up to 30 days. Max Daily Amount: 30 mg.    aspirin 81 mg chewable tablet Take 1 Tab by mouth daily.  dexAMETHasone (DECADRON) 4 mg tablet 4 mg PO q8h x 2 days then 2 mg PO q8h x 2 days then 2 mg PO q12h x 2 days then 2mg PO daily x 2 days then stop.  levETIRAcetam (KEPPRA) 500 mg tablet Take 1 Tab by mouth two (2) times a day.  megestroL (MEGACE) 40 mg tablet Take 1 Tab by mouth daily.  omeprazole (PRILOSEC) 10 mg capsule 20 mg two (2) times a day.  zolpidem (Ambien) 10 mg tablet Take 12.5 mg by mouth nightly as needed for Sleep.  atorvastatin (LIPITOR) 20 mg tablet Take 20 mg by mouth nightly. No current facility-administered medications for this visit. No Known Allergies     A complete review of systems was obtained, negative except as described above and as reported on ROS sheet scanned into system.        Physical Exam:     Visit Vitals  /73 (BP 1 Location: Left upper arm, BP Patient Position: Sitting)   Pulse 77   Temp 97.7 °F (36.5 °C) (Temporal)   Wt 113 lb 3.2 oz (51.3 kg) SpO2 99%   BMI 20.70 kg/m²     ECOG PS: 1  General: No distress  Eyes: PERRLA, anicteric sclerae  HENT: Atraumatic  Neck: Supple  Respiratory:  normal respiratory effort  MS: ambulatory in NAD  Skin: No rashes, ecchymoses, or petechiae. Normal temperature, turgor, and texture. Psych: Alert, oriented, appropriate affect, normal judgment/insight  Mass on LUE extremity  Last visit: Breast no masses on LEFT, right mass outer mid quadrant, mobile about 1.5 cm    Results:     Lab Results   Component Value Date/Time    WBC 17.0 (H) 05/14/2021 03:57 AM    HGB 7.7 (L) 05/14/2021 03:57 AM    HCT 25.3 (L) 05/14/2021 03:57 AM    PLATELET 108 (H) 05/34/8207 03:57 AM    MCV 80.3 05/14/2021 03:57 AM    ABS. NEUTROPHILS 14.9 (H) 05/14/2021 03:57 AM     Lab Results   Component Value Date/Time    Sodium 137 05/14/2021 03:57 AM    Potassium 3.2 (L) 05/14/2021 03:57 AM    Chloride 108 05/14/2021 03:57 AM    CO2 24 05/14/2021 03:57 AM    Glucose 134 (H) 05/14/2021 03:57 AM    BUN 15 05/14/2021 03:57 AM    Creatinine 0.46 (L) 05/14/2021 03:57 AM    GFR est AA >60 05/14/2021 03:57 AM    GFR est non-AA >60 05/14/2021 03:57 AM    Calcium 9.0 05/14/2021 03:57 AM     Lab Results   Component Value Date/Time    Bilirubin, total 0.2 05/13/2021 03:30 AM    ALT (SGPT) 21 05/13/2021 03:30 AM    Alk. phosphatase 88 05/13/2021 03:30 AM    Protein, total 6.2 (L) 05/13/2021 03:30 AM    Albumin 3.5 05/13/2021 03:30 AM    Globulin 2.7 05/13/2021 03:30 AM       CT Results (most recent):  Results from Hospital Encounter encounter on 05/11/21    CT HEAD W WO CONT    Narrative  EXAM: CT HEAD W WO CONT    INDICATION: brain met    COMPARISON: 5/11/2021. CONTRAST: 100 mL of Isovue-300. TECHNIQUE:CT of the head was performed prior to and following uneventful rapid  bolus intravenous administration of contrast.  Brain and bone windows were  generated. Coronal and sagittal reformats.  CT dose reduction was achieved  through use of a standardized protocol tailored for this examination and  automatic exposure control for dose modulation. FINDINGS:  Again noted is vasogenic edema in the left frontal lobe with 7 mm left to right  shift and mass effect on the frontal horn left lateral.. There is a 2.1 x 1.4 cm  peripherally enhancing mass in the left frontal lobe. Lateral to this mass in  the left frontal lobe, there is a 2.1 x 1.1 cm mass left frontal lobe based  against the dura. There is a 1 x 0.5 cm cystic mass right occipital lobe with a small amount of  vasogenic edema. The bone windows demonstrate no abnormalities. The visualized portions of the  paranasal sinuses and mastoid air cells are clear. Impression  1. There are 2 masses in the left frontal region adjacent to each other with  vasogenic edema and 7 mm subfalcine left to right shift. There is also a 1 x 0.5 cm cystic mass in the right occipital lobe. Findings are  consistent with metastatic disease. Records reviewed and summarized above. Pathology report(s) reviewed above. Radiology report(s) reviewed above. 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 Limited sample and not able to do molecular testing. Unclear site of origin. Lung mass/ colon mass/bone mets/ breast mass/ large LUE mass  Found on CT. CT head shows mets. Needs brain MRI but cannot do due to clostrophobia. Could be metastatic lung or breast cancer to brain with synchronous colon primary. Needs colo for mass biopsy. Needs further breast mass eval.   Path was from soft tissue mass. So no path from lung either. molecular testing will be helpful but not enough tissue. Could do liquid testing. Long d/w pt and daughter today about options. Would start with brain treatment first.  Send to gamma knife team. D/w Dr Susie Acuña today. Then would treat arm mass with XRT as this is most painful site.    Then discussed palliative chemo based on molecular testing if possible. If no testing, could do carbo/ taxol vs FOLFOX. After all discussion today, pt wants to be seen and treated at [facility] where all her doctors and care is. Refer to Med/onc at [facility]. Pt clinically stable today. Will f/u with us prn.     2) brain mets on CT. Needs brain MRI but cannot do due to clostrophobia. Rad/onc and gamma knife team eval.      3) breast mass on exam. Can do mammo/ ultrasound biopsy if needed for tissue. 4) anemia. Drop from 10 to 8. Monitor. Check iron levels since has colon mass. 5) colon mass. Can do colo if path needed. See GI at [facility]. Fu with them. 6) psychosocial.  Mood good. Daughter here and supportive. Pt wants care at [facility]. SW support today. Follow up here prn. Call if questions    I appreciate the opportunity to participate in Ms. Terese Matos care.     Signed By: Sarah Garcia, DO

## 2021-05-24 NOTE — PROGRESS NOTES
Oncology Social Work  Psychosocial Assessment    Reason for Assessment:      [] Social Work Referral [x] Initial Assessment [] New Diagnosis [] Other    Advance Care Planning:  Advance Care Planning 5/11/2021   Confirm Advance Directive None   Patient does not have an advanced medical directive, and did not express interest in completing one today. Provided a blank AMD and offered to assist with this document if needed, or refer to [facility]. Provided contact information for Cancer [facility]. Sources of Information:    [x]Patient  [x]Family  []Staff  []Medical Record    Mental Status:    [x]Alert  []Lethargic  []Unresponsive   [] Unable to assess   Oriented to:  [x]Person  [x]Place  [x]Time  [x]Situation      Relationship Status:  []Single  [x]  []Significant Other/Life Partner  []  []  []    Living Circumstances:  []Lives Alone  [x]Family/Significant Other in Household  []Roommates  []Children in the Home  []Paid Caregivers  []Assisted Living Facility/Group Home  []Skilled [address]  []Homeless  []Incarcerated  []Environmental/Care Concerns  []Other:    Employment Status:  []Employed Full-time []Employed Part-time []Homemaker  [x] Retired [] Short-Term Disability [] [facility]  [] Unemployed     Barriers to Learning:    []Language  []Developmental  []Cognitive  []Altered Mental Status  []Visual/Hearing Impairment  []Unable to Read/Write  []Motivational  [] Challenges Understanding Medical Jargon [x]No Barriers Identified      Financial/Legal Concerns:    []Uninsured  []Limited Income/Resources  []Non-Citizen  []Food Insecurity [x]No Concerns Identified   []Other:    Judaism/Spiritual/Existential:  Does patient have any spiritual or Latter-day beliefs? [x] Yes [] No  Is the patient involved in a spiritual, cinthya or Latter-day community?  [] Yes [] No  Patient expressing spiritual/existential angst? [] Yes [x] No  Notes: Patient identifies as non-Taoism. Support System:    Identified Support Person/Group:  [x]Strong  []Fair  []Limited    Coping with Illness:   [x]  Coping Well  [] Challenges Coping with Serious Illness [] Situational Depression [] Situational Anxiety [] Anticipatory Grief  [] Recent Loss [] Caregiver [place]            Narrative:   Met with the patient and her daughter, Valorie Yancey. Patient is being seen for new metastatic cancer. Patient lives with her  in [place], [place]. Her daughter lives nearby and is supportive. She also has a step-son, who lives in [address] [place] [address]. The patient is retired, having worked for [employer] in [place]. She would prefer to receive treatment at [facility]. Provided information for [facility], P#[phone]. The patient does not have a preference of provider. The referral coordinator from this oncology office will help in making these arrangements. Provided this 's contact information and offered continued support as needed. Plan:   1. Introduced self and role of this  in the [employer]. 2.  Ongoing psychosocial support as desired by patient. Referral:   No referrals placed at this time.    Jairo Brittle Carretto, LCSW

## 2021-05-24 NOTE — PROGRESS NOTES
Referral to Oncology follow-up:  I have faxed referral and patients records to [facility] Hematology&Medical Oncology office to fax number: [phone], fax confirmation was received. The PSR stated they will contact the patient for an appointment once they review referral/records. Will follow-up!   Tomer Juárez

## 2021-05-24 NOTE — PROGRESS NOTES
Update on Referral to Rad/Onc:  I have faxed referral to the Radiation Oncology office to fax number: [phone], fax confirmation was received. Their office should be giving patient a call to schedule for an appointment once records a reviewed. Will follow-up.   Devan Boogie

## 2021-05-24 NOTE — PROGRESS NOTES
Message forwarded to Palliative Care RN to help get patient an appointment. Referred to Rad/Onc. Sent e-mail to add patient's case to Cancer Conference on Tuesday 6/1/21.

## 2021-05-26 ENCOUNTER — DOCUMENTATION ONLY (OUTPATIENT)
Dept: ONCOLOGY | Age: 76
End: 2021-05-26

## 2021-05-26 NOTE — PROGRESS NOTES
Referral for Rad/Onc update:  I have received a fax sheet from the Radiation Oncology office letting me know this patient is now scheduled for June 7th, 2021 to be seen by this office! Patient is now scheduled. I had attempted to reach patient to make her aware of this appointment if she is not aware already, no answer. Left a detailed voicemail stating her appt date and time! This is now DONE.   Maggie Cabrera

## 2021-06-03 ENCOUNTER — TELEPHONE (OUTPATIENT)
Dept: ONCOLOGY | Age: 76
End: 2021-06-03

## 2021-06-03 DIAGNOSIS — C79.9 METASTATIC MALIGNANT NEOPLASM, UNSPECIFIED SITE (HCC): Primary | ICD-10-CM

## 2021-06-03 NOTE — TELEPHONE ENCOUNTER
[facility] called and stated they need an order faxed over for patient to be admitted to hospice asap    Fax: [phone]

## 2021-06-03 NOTE — TELEPHONE ENCOUNTER
Returned call to [address] [place] [address], [phone]. Patient ID verified. States has been following patient post hospital d/c for [facility], patient has further declined, daughter would like Hospice order placed and faxed to [facility] attn at [phone]. Per Sarika Reeves can see patient this afternoon. Update to Provider.

## 2022-03-18 PROBLEM — R06.02 SOB (SHORTNESS OF BREATH): Status: ACTIVE | Noted: 2021-05-11

## 2023-05-15 RX ORDER — MEGESTROL ACETATE 40 MG/1
40 TABLET ORAL DAILY
COMMUNITY
Start: 2021-05-15

## 2023-05-15 RX ORDER — DEXAMETHASONE 4 MG/1
TABLET ORAL
COMMUNITY
Start: 2021-05-14

## 2023-05-15 RX ORDER — LEVETIRACETAM 500 MG/1
500 TABLET ORAL 2 TIMES DAILY
COMMUNITY
Start: 2021-05-14

## 2023-05-15 RX ORDER — ZOLPIDEM TARTRATE 10 MG/1
12.5 TABLET ORAL
COMMUNITY

## 2023-05-15 RX ORDER — ASPIRIN 81 MG/1
81 TABLET, CHEWABLE ORAL DAILY
COMMUNITY
Start: 2021-05-15

## 2023-05-15 RX ORDER — ATORVASTATIN CALCIUM 20 MG/1
20 TABLET, FILM COATED ORAL NIGHTLY
COMMUNITY

## 2023-05-15 RX ORDER — OMEPRAZOLE 10 MG/1
20 CAPSULE, DELAYED RELEASE ORAL 2 TIMES DAILY
COMMUNITY